# Patient Record
Sex: FEMALE | Race: WHITE | NOT HISPANIC OR LATINO | Employment: OTHER | URBAN - METROPOLITAN AREA
[De-identification: names, ages, dates, MRNs, and addresses within clinical notes are randomized per-mention and may not be internally consistent; named-entity substitution may affect disease eponyms.]

---

## 2017-02-02 RX ORDER — BENAZEPRIL HYDROCHLORIDE 40 MG/1
40 TABLET ORAL DAILY
Qty: 30 TAB | Refills: 11 | Status: SHIPPED | OUTPATIENT
Start: 2017-02-02 | End: 2018-04-05 | Stop reason: SDUPTHER

## 2017-02-02 NOTE — TELEPHONE ENCOUNTER
Was the patient seen in the last year in this department? Yes     Does patient have an active prescription for medications requested? Yes     Received Request Via: Pharmacy     Patient switched pharmacy and needs 90 supply if possible. Thank you!

## 2017-02-16 ENCOUNTER — HOSPITAL ENCOUNTER (OUTPATIENT)
Dept: LAB | Facility: MEDICAL CENTER | Age: 72
End: 2017-02-16
Attending: INTERNAL MEDICINE
Payer: MEDICARE

## 2017-02-16 DIAGNOSIS — E78.5 DYSLIPIDEMIA: ICD-10-CM

## 2017-02-16 DIAGNOSIS — E66.9 OBESITY, CLASS II, BMI 35-39.9, ISOLATED: ICD-10-CM

## 2017-02-16 DIAGNOSIS — E03.8 OTHER SPECIFIED HYPOTHYROIDISM: ICD-10-CM

## 2017-02-16 LAB
ALBUMIN SERPL BCP-MCNC: 4.3 G/DL (ref 3.2–4.9)
ALBUMIN/GLOB SERPL: 1.6 G/DL
ALP SERPL-CCNC: 84 U/L (ref 30–99)
ALT SERPL-CCNC: 54 U/L (ref 2–50)
ANION GAP SERPL CALC-SCNC: 6 MMOL/L (ref 0–11.9)
AST SERPL-CCNC: 37 U/L (ref 12–45)
BASOPHILS # BLD AUTO: 0.6 % (ref 0–1.8)
BASOPHILS # BLD: 0.03 K/UL (ref 0–0.12)
BILIRUB SERPL-MCNC: 0.5 MG/DL (ref 0.1–1.5)
BUN SERPL-MCNC: 16 MG/DL (ref 8–22)
CALCIUM SERPL-MCNC: 10.3 MG/DL (ref 8.4–10.2)
CHLORIDE SERPL-SCNC: 109 MMOL/L (ref 96–112)
CHOLEST SERPL-MCNC: 158 MG/DL (ref 100–199)
CO2 SERPL-SCNC: 22 MMOL/L (ref 20–33)
CREAT SERPL-MCNC: 0.55 MG/DL (ref 0.5–1.4)
EOSINOPHIL # BLD AUTO: 0.31 K/UL (ref 0–0.51)
EOSINOPHIL NFR BLD: 6.5 % (ref 0–6.9)
ERYTHROCYTE [DISTWIDTH] IN BLOOD BY AUTOMATED COUNT: 39.9 FL (ref 35.9–50)
GFR SERPL CREATININE-BSD FRML MDRD: >60 ML/MIN/1.73 M 2
GLOBULIN SER CALC-MCNC: 2.7 G/DL (ref 1.9–3.5)
GLUCOSE SERPL-MCNC: 125 MG/DL (ref 65–99)
HCT VFR BLD AUTO: 39.1 % (ref 37–47)
HDLC SERPL-MCNC: 42 MG/DL
HGB BLD-MCNC: 13.5 G/DL (ref 12–16)
IMM GRANULOCYTES # BLD AUTO: 0.01 K/UL (ref 0–0.11)
IMM GRANULOCYTES NFR BLD AUTO: 0.2 % (ref 0–0.9)
LDLC SERPL CALC-MCNC: 92 MG/DL
LYMPHOCYTES # BLD AUTO: 1 K/UL (ref 1–4.8)
LYMPHOCYTES NFR BLD: 21.1 % (ref 22–41)
MCH RBC QN AUTO: 29.5 PG (ref 27–33)
MCHC RBC AUTO-ENTMCNC: 34.5 G/DL (ref 33.6–35)
MCV RBC AUTO: 85.6 FL (ref 81.4–97.8)
MONOCYTES # BLD AUTO: 0.53 K/UL (ref 0–0.85)
MONOCYTES NFR BLD AUTO: 11.2 % (ref 0–13.4)
NEUTROPHILS # BLD AUTO: 2.87 K/UL (ref 2–7.15)
NEUTROPHILS NFR BLD: 60.4 % (ref 44–72)
NRBC # BLD AUTO: 0 K/UL
NRBC BLD AUTO-RTO: 0 /100 WBC
PLATELET # BLD AUTO: 181 K/UL (ref 164–446)
PMV BLD AUTO: 9.7 FL (ref 9–12.9)
POTASSIUM SERPL-SCNC: 4 MMOL/L (ref 3.6–5.5)
PROT SERPL-MCNC: 7 G/DL (ref 6–8.2)
RBC # BLD AUTO: 4.57 M/UL (ref 4.2–5.4)
SODIUM SERPL-SCNC: 137 MMOL/L (ref 135–145)
T4 FREE SERPL-MCNC: 0.98 NG/DL (ref 0.58–1.64)
TRIGL SERPL-MCNC: 120 MG/DL (ref 0–149)
TSH SERPL DL<=0.005 MIU/L-ACNC: 4.13 UIU/ML (ref 0.35–5.5)
WBC # BLD AUTO: 4.8 K/UL (ref 4.8–10.8)

## 2017-02-16 PROCEDURE — 80061 LIPID PANEL: CPT

## 2017-02-16 PROCEDURE — 84443 ASSAY THYROID STIM HORMONE: CPT

## 2017-02-16 PROCEDURE — 36415 COLL VENOUS BLD VENIPUNCTURE: CPT

## 2017-02-16 PROCEDURE — 84439 ASSAY OF FREE THYROXINE: CPT

## 2017-02-16 PROCEDURE — 85025 COMPLETE CBC W/AUTO DIFF WBC: CPT

## 2017-02-16 PROCEDURE — 80053 COMPREHEN METABOLIC PANEL: CPT

## 2017-02-21 RX ORDER — LEVOTHYROXINE SODIUM 88 UG/1
88 TABLET ORAL
Qty: 30 TAB | Refills: 11 | Status: SHIPPED | OUTPATIENT
Start: 2017-02-21 | End: 2017-12-13 | Stop reason: SDUPTHER

## 2017-02-24 ENCOUNTER — OFFICE VISIT (OUTPATIENT)
Dept: MEDICAL GROUP | Facility: CLINIC | Age: 72
End: 2017-02-24
Payer: MEDICARE

## 2017-02-24 VITALS
TEMPERATURE: 98.2 F | DIASTOLIC BLOOD PRESSURE: 68 MMHG | SYSTOLIC BLOOD PRESSURE: 130 MMHG | HEIGHT: 63 IN | WEIGHT: 195 LBS | BODY MASS INDEX: 34.55 KG/M2 | HEART RATE: 79 BPM | OXYGEN SATURATION: 97 %

## 2017-02-24 DIAGNOSIS — E66.9 OBESITY (BMI 30-39.9): ICD-10-CM

## 2017-02-24 DIAGNOSIS — Z12.11 SCREENING FOR COLON CANCER: ICD-10-CM

## 2017-02-24 DIAGNOSIS — R73.02 IGT (IMPAIRED GLUCOSE TOLERANCE): ICD-10-CM

## 2017-02-24 PROCEDURE — 4040F PNEUMOC VAC/ADMIN/RCVD: CPT | Mod: 8P | Performed by: INTERNAL MEDICINE

## 2017-02-24 PROCEDURE — 3014F SCREEN MAMMO DOC REV: CPT | Performed by: INTERNAL MEDICINE

## 2017-02-24 PROCEDURE — 99214 OFFICE O/P EST MOD 30 MIN: CPT | Performed by: INTERNAL MEDICINE

## 2017-02-24 PROCEDURE — G8419 CALC BMI OUT NRM PARAM NOF/U: HCPCS | Performed by: INTERNAL MEDICINE

## 2017-02-24 PROCEDURE — G8484 FLU IMMUNIZE NO ADMIN: HCPCS | Performed by: INTERNAL MEDICINE

## 2017-02-24 PROCEDURE — 1101F PT FALLS ASSESS-DOCD LE1/YR: CPT | Performed by: INTERNAL MEDICINE

## 2017-02-24 PROCEDURE — 3017F COLORECTAL CA SCREEN DOC REV: CPT | Mod: 1P | Performed by: INTERNAL MEDICINE

## 2017-02-24 PROCEDURE — 1036F TOBACCO NON-USER: CPT | Performed by: INTERNAL MEDICINE

## 2017-02-24 PROCEDURE — G8432 DEP SCR NOT DOC, RNG: HCPCS | Performed by: INTERNAL MEDICINE

## 2017-02-24 NOTE — PROGRESS NOTES
CC: Shikha Swift is a 71 y.o. female is suffering from   Chief Complaint   Patient presents with   • Follow-Up     [facility] on labs          SUBJECTIVE:  1. Obesity (BMI 30-39.9)  Patient's here for follow-up we've discussed repeatedly her need work on diet exercise now that it's past holiday seasons.     2. Screening for colon cancer  Patient needs screening for colon cancer, referral to gastroenterology written.     3. IGT (impaired glucose tolerance)  Impaired glucose tolerance, very close to being diabetic recheck fasting comprehensive metabolic panel in 6 months        Past social, family, history: , [place]  Social History   Substance Use Topics   • Smoking status: Never Smoker    • Smokeless tobacco: Never Used   • Alcohol Use: 0.0 oz/week      Comment: 1-2 per month         MEDICATIONS:    Current outpatient prescriptions:   •  levothyroxine (SYNTHROID) 88 MCG Tab, Take 1 Tab by mouth Every morning on an empty stomach., Disp: 30 Tab, Rfl: 11  •  benazepril (LOTENSIN) 40 MG tablet, Take 1 Tab by mouth every day., Disp: 30 Tab, Rfl: 11  •  atorvastatin (LIPITOR) 10 MG Tab, TAKE 1 TABLET BY MOUTH EVERY DAY., Disp: 90 Tab, Rfl: 3  •  amlodipine (NORVASC) 5 MG Tab, TAKE 1 TAB BY MOUTH EVERY DAY., Disp: 90 Tab, Rfl: 3  •  estradiol (ESTRACE VAGINAL) 0.1 MG/GM vaginal cream, Insert 1 g in vagina Every Sunday and Wednesday., Disp: , Rfl:   •  PROGESTERONE, Apply 1 Application to affected area(s) every evening. Compounded cream [facility], Disp: , Rfl:   •  gabapentin (NEURONTIN) 300 MG Cap, Take 3 Caps by mouth every evening., Disp: 90 Cap, Rfl: 2  •  polyethylene glycol 3350 (MIRALAX) Powder, Take 17 g by mouth every day., Disp: 1 Bottle, Rfl: 3  •  amoxicillin (AMOXIL) 500 MG Cap, Take 4 Caps by mouth 1 time daily as needed (1 hour before dental proceedures.)., Disp: 12 Cap, Rfl: 1  •  HYDROmorphone (DILAUDID) 2 MG Tab, Take 0.5-2 Tabs by mouth every four hours as needed (pain)., Disp: 90 Tab,  "Rfl: 0  •  Probiotic Product (ACIDOPHILUS PROBIOTIC BLEND PO), Take 15 mL by mouth every day., Disp: , Rfl:   •  Ibuprofen (ADVIL) 200 MG CAPS, Take 1-2 Caps by mouth 1 time daily as needed., Disp: , Rfl:     PROBLEMS:  Patient Active Problem List    Diagnosis Date Noted   • Obesity (BMI 30-39.9) 02/24/2017   • Primary localized osteoarthritis of left hip 02/16/2016   • Atypical face pain    • Essential hypertension 08/11/2015   • Obesity, Class II, BMI 35-39.9, isolated (CMS-[facility]) 06/15/2015   • Headache 03/01/2012   • Dyslipidemia 05/22/2009   • Impaired glucose tolerance 05/22/2009   • Hypothyroid 05/22/2009       REVIEW OF SYSTEMS:  Gen.:  No Nausea, Vomiting, fever, Chills.  Heart: No chest pain.  Lungs:  No shortness of Breath.  Psychological: Mack unusual Anxiety depression     PHYSICAL EXAM   Constitutional: Alert, cooperative, not in acute distress.  Cardiovascular:  Rate Rhythm is regular without murmurs rubs clicks.     Thorax & Lungs: Clear to auscultation, no wheezing, rhonchi, or rales  HENT: Normocephalic, Atraumatic.  Eyes: PERRLA, EOMI, Conjunctiva normal.   Neck: Trachia is midline no swelling of the thyroid.   Lymphatic: No lymphadenopathy noted.   Neurologic: Alert & oriented x 3, cranial nerves II through XII are intact, Normal motor function, Normal sensory function, No focal deficits noted.   Psychiatric: Affect normal, Judgment normal, Mood normal.     VITAL SIGNS:/68 mmHg  Pulse 79  Temp(Src) 36.8 °C (98.2 °F)  Ht 1.6 m (5' 3\")  Wt 88.451 kg (195 lb)  BMI 34.55 kg/m2  SpO2 97%    Labs: Reviewed    Assessment:                                                     Plan:    1. Obesity (BMI 30-39.9)  Patient with excessive weight and encouraged patient to work on diet exercise    2. Screening for colon cancer  Referral to gastroenterology for colonoscopy orders written  - REFERRAL TO GI FOR COLONOSCOPY    3. IGT (impaired glucose tolerance)  Patient was at significant risk for type 2 " diabetes recheck conference metabolic panel in 6 months  - COMP METABOLIC PANEL; Future

## 2017-02-24 NOTE — MR AVS SNAPSHOT
"        Shikha Swift   2017 9:20 AM   Office Visit   MRN: [id number]    Department:  [facility]   Dept Phone:  [phone]    Description:  Female : 1945   Provider:  Presley Black D.O.           Reason for Visit     Follow-Up [facility] on labs       Allergies as of 2017     Allergen Noted Reactions    Hydrocodone 2015   Vomiting, Nausea    GXO=0862      Percocet [Oxycodone-Acetaminophen] 2016   Vomiting, Nausea    OHK=4122    Vicodin [Hydrocodone-Acetaminophen] 2016   Vomiting, Nausea    VJT=0350      You were diagnosed with     Obesity (BMI 30-39.9)   [701376]       Screening for colon cancer   [828973]       IGT (impaired glucose tolerance)   [378497]         Vital Signs     Blood Pressure Pulse Temperature Height Weight Body Mass Index    130/68 mmHg 79 36.8 °C (98.2 °F) 1.6 m (5' 3\") 88.451 kg (195 lb) 34.55 kg/m2    Oxygen Saturation Smoking Status                97% Never Smoker           Basic Information     Date Of Birth Sex Race Ethnicity Preferred Language    1945 Female White Non- English      Your appointments     Aug 28, 2017 11:00 AM   Established Patient with Presley Black D.O.   [address] [id number] [phone]           You will be receiving a confirmation call a few days before your appointment from our automated call confirmation system.              Problem List              ICD-10-CM Priority Class Noted - Resolved    Dyslipidemia E78.5   2009 - Present    Impaired glucose tolerance R73.02   2009 - Present    Hypothyroid E03.9   2009 - Present    Headache R51   3/1/2012 - Present    Obesity, Class II, BMI 35-39.9, isolated (CMS-HCC) E66.01   6/15/2015 - Present    Essential hypertension I10   2015 - Present    Atypical face pain G50.1   Unknown - Present    Primary localized osteoarthritis of left hip M16.12   2016 - Present   " Obesity (BMI 30-39.9) E66.9   2/24/2017 - Present      Health Maintenance        Date Due Completion Dates    IMM DTaP/Tdap/Td Vaccine (1 - Tdap) 4/29/1966 ---    COLONOSCOPY 4/29/1997 ---    IMM PNEUMOCOCCAL 65+ (ADULT) LOW/MEDIUM RISK SERIES (1 of 2 - PCV13) 4/29/2012 ---    IMM INFLUENZA (1) 9/1/2016 ---    MAMMOGRAM 11/7/2017 11/7/2016, 11/4/2015, 11/3/2014, 10/30/2013, 10/29/2012, 10/27/2011, 10/25/2010, 10/23/2009, 10/23/2009, 10/6/2008, 10/6/2008, 10/3/2007, 10/3/2007, 10/2/2006, 10/2/2006, 8/4/2005, 2/12/2004, 1/29/2004    BONE DENSITY 11/20/2018 11/20/2013 (Done)    Override on 11/20/2013: Done (Dr. Stevenson )            Current Immunizations     SHINGLES VACCINE 9/15/2015      Below and/or attached are the medications your provider expects you to take. Review all of your home medications and newly ordered medications with your provider and/or pharmacist. Follow medication instructions as directed by your provider and/or pharmacist. Please keep your medication list with you and share with your provider. Update the information when medications are discontinued, doses are changed, or new medications (including over-the-counter products) are added; and carry medication information at all times in the event of emergency situations     Allergies:  HYDROCODONE - Vomiting,Nausea     PERCOCET - Vomiting,Nausea     VICODIN - Vomiting,Nausea               Medications  Valid as of: February 24, 2017 -  9:58 AM    Generic Name Brand Name Tablet Size Instructions for use    AmLODIPine Besylate (Tab) NORVASC 5 MG TAKE 1 TAB BY MOUTH EVERY DAY.        Amoxicillin (Cap) AMOXIL 500 MG Take 4 Caps by mouth 1 time daily as needed (1 hour before dental proceedures.).        Atorvastatin Calcium (Tab) LIPITOR 10 MG TAKE 1 TABLET BY MOUTH EVERY DAY.        Benazepril HCl (Tab) LOTENSIN 40 MG Take 1 Tab by mouth every day.        Estradiol (Cream) ESTRACE 0.1 MG/GM Insert 1 g in vagina Every Sunday and Wednesday.         Gabapentin (Cap) NEURONTIN 300 MG Take 3 Caps by mouth every evening.        HYDROmorphone HCl (Tab) DILAUDID 2 MG Take 0.5-2 Tabs by mouth every four hours as needed (pain).        Ibuprofen (Cap) Ibuprofen 200 MG Take 1-2 Caps by mouth 1 time daily as needed.        Levothyroxine Sodium (Tab) SYNTHROID 88 MCG Take 1 Tab by mouth Every morning on an empty stomach.        Polyethylene Glycol 3350 (Powder) MIRALAX  Take 17 g by mouth every day.        Probiotic Product   Take 15 mL by mouth every day.        Progesterone   Apply 1 Application to affected area(s) every evening. Compounded cream [facility]        .                 Medicines prescribed today were sent to:     [facility] [address] [place] [id number] [facility]    [address] [facility] [address]    Phone: [phone] Fax: [phone]    Open 24 Hours?: No      Medication refill instructions:       If your prescription bottle indicates you have medication refills left, it is not necessary to call your provider’s office. Please contact your pharmacy and they will refill your medication.    If your prescription bottle indicates you do not have any refills left, you may request refills at any time through one of the following ways: The online uberVU system (except Urgent Care), by calling your provider’s office, or by asking your pharmacy to contact your provider’s office with a refill request. Medication refills are processed only during regular business hours and may not be available until the next business day. Your provider may request additional information or to have a follow-up visit with you prior to refilling your medication.   *Please Note: Medication refills are assigned a new Rx number when refilled electronically. Your pharmacy may indicate that no refills were authorized even though a new prescription for the same medication is available at the pharmacy. Please request the medicine  by name with the pharmacy before contacting your provider for a refill.        Your To Do List     Future Labs/Procedures Complete By Expires    COMP METABOLIC PANEL  As directed 2/25/2018      Referral     A referral request has been sent to our patient care coordination department. Please allow 3-5 business days for us to process this request and contact you either by phone or mail. If you do not hear from us by the 5th business day, please call us at [phone].           MyChart Status: Patient Declined

## 2017-03-27 ENCOUNTER — PATIENT OUTREACH (OUTPATIENT)
Dept: HEALTH INFORMATION MANAGEMENT | Facility: OTHER | Age: 72
End: 2017-03-27

## 2017-03-28 ENCOUNTER — PATIENT OUTREACH (OUTPATIENT)
Dept: HEALTH INFORMATION MANAGEMENT | Facility: OTHER | Age: 72
End: 2017-03-28

## 2017-03-28 NOTE — PROGRESS NOTES
Attempt #:1    Verify PCP: yes    Communication Preference Obtained: yes     Review Care Team: yes    Annual Wellness Visit Scheduling  1. Scheduling Status:Scheduled     Care Gap Scheduling (Attempt to Schedule EACH Overdue Care Gap!)     Health Maintenance Due   Topic Date Due   • Annual Wellness Visit  SCHEDULED   • COLONOSCOPY  WILL DISCUSS WITH PCP         Juan Activation: declined  Imeldahart Joe: no  Virtual Visits: no  Opt In to Text Messages: no

## 2017-04-21 ENCOUNTER — OFFICE VISIT (OUTPATIENT)
Dept: MEDICAL GROUP | Facility: CLINIC | Age: 72
End: 2017-04-21
Payer: MEDICARE

## 2017-04-21 VITALS
WEIGHT: 195 LBS | SYSTOLIC BLOOD PRESSURE: 144 MMHG | BODY MASS INDEX: 34.55 KG/M2 | HEIGHT: 63 IN | HEART RATE: 66 BPM | OXYGEN SATURATION: 95 % | TEMPERATURE: 99.1 F | DIASTOLIC BLOOD PRESSURE: 86 MMHG

## 2017-04-21 DIAGNOSIS — E78.5 DYSLIPIDEMIA: ICD-10-CM

## 2017-04-21 DIAGNOSIS — Z96.642 STATUS POST TOTAL HIP REPLACEMENT, LEFT: ICD-10-CM

## 2017-04-21 DIAGNOSIS — E66.9 OBESITY, CLASS II, BMI 35-39.9, ISOLATED: ICD-10-CM

## 2017-04-21 DIAGNOSIS — Z00.00 MEDICARE ANNUAL WELLNESS VISIT, INITIAL: Primary | ICD-10-CM

## 2017-04-21 DIAGNOSIS — G50.1 ATYPICAL FACE PAIN: ICD-10-CM

## 2017-04-21 DIAGNOSIS — R73.02 IMPAIRED GLUCOSE TOLERANCE: ICD-10-CM

## 2017-04-21 DIAGNOSIS — I10 ESSENTIAL HYPERTENSION: ICD-10-CM

## 2017-04-21 DIAGNOSIS — E66.9 OBESITY (BMI 30-39.9): ICD-10-CM

## 2017-04-21 PROCEDURE — 1036F TOBACCO NON-USER: CPT | Performed by: NURSE PRACTITIONER

## 2017-04-21 PROCEDURE — G8510 SCR DEP NEG, NO PLAN REQD: HCPCS | Performed by: NURSE PRACTITIONER

## 2017-04-21 PROCEDURE — G0438 PPPS, INITIAL VISIT: HCPCS | Performed by: NURSE PRACTITIONER

## 2017-04-21 ASSESSMENT — PATIENT HEALTH QUESTIONNAIRE - PHQ9: CLINICAL INTERPRETATION OF PHQ2 SCORE: 1

## 2017-04-21 NOTE — ASSESSMENT & PLAN NOTE
Total L hip replacement 2/16/2016  States good mobility and pain free  Followed by ortho, next visit 5 yrs

## 2017-04-21 NOTE — PATIENT INSTRUCTIONS
Continue with care through Presley Black D.O..  Next Medicare Annual Wellness Visit is due in one year.    Continue care with specialists you are seeing for your chronic problems.    Attached is some preventative information for you to read.          Fall Prevention and Home Safety  Falls cause injuries and can affect all age groups. It is possible to prevent falls.   HOW TO PREVENT FALLS  · Wear shoes with rubber soles that do not have an opening for your toes.   · Keep the inside and outside of your house well lit.   · Use night lights throughout your home.   · Remove clutter from floors.   · Clean up floor spills.   · Remove throw rugs or fasten them to the floor with carpet tape.   · Do not place electrical cords across pathways.   · Put grab bars by your tub, shower, and toilet. Do not use towel bars as grab bars.   · Put handrails on both sides of the stairway. Fix loose handrails.   · Do not climb on stools or stepladders, if possible.   · Do not wax your floors.   · Repair uneven or unsafe sidewalks, walkways, or stairs.   · Keep items you use a lot within reach.   · Be aware of pets.   · Keep emergency numbers next to the telephone.   · Put smoke detectors in your home and near bedrooms.   Ask your doctor what other things you can do to prevent falls.  Document Released: 10/14/2010 Document Revised: 06/18/2013 Document Reviewed: 03/19/2013  ExitCare® Patient Information ©2013 [employer].    Exercise to Stay Healthy      Exercise helps you become and stay healthy.  EXERCISE IDEAS AND TIPS  Choose exercises that:  · You enjoy.   · Fit into your day.   You do not need to exercise really hard to be healthy. You can do exercises at a slow or medium level and stay healthy. You can:  · Stretch before and after working out.   · Try yoga, Pilates, or lew chi.   · Lift weights.   · Walk fast, swim, jog, run, climb stairs, bicycle, dance, or rollerskate.   · Take aerobic classes.   Exercises that burn about  150 calories:  · Running 1 ½ miles in 15 minutes.   · Playing volleyball for 45 to 60 minutes.   · Washing and waxing a car for 45 to 60 minutes.   · Playing touch football for 45 minutes.   · Walking 1 ¾ miles in 35 minutes.   · Pushing a stroller 1 ½ miles in 30 minutes.   · Playing basketball for 30 minutes.   · Raking leaves for 30 minutes.   · Bicycling 5 miles in 30 minutes.   · Walking 2 miles in 30 minutes.   · Dancing for 30 minutes.   · Shoveling snow for 15 minutes.   · Swimming laps for 20 minutes.   · Walking up stairs for 15 minutes.   · Bicycling 4 miles in 15 minutes.   · Gardening for 30 to 45 minutes.   · Jumping rope for 15 minutes.   · Washing windows or floors for 45 to 60 minutes.     One suggestion is to start walking for 10 minutes after each meal.  This will help with digestion and help you to metabolize your meal.       For Weight Loss -   Recommend portion sizes with more fruits/vegetables/high fiber foods.  Stay away from white bread/pastas/white rice/white potatoes.  Increase Fluid intake to 6-8 glasses of water daily.   Eliminate soda's (diet and regular) from your fluid intake.      Document Released: 01/20/2012 Document Revised: 03/11/2013 Document Reviewed: 01/20/2012  ExitCare® Patient Information ©2013 [employer].    Fat and Cholesterol Control Diet  Cholesterol is a wax-like substance. It comes from your liver and is found in certain foods. There is good (HDL) and bad (LDL) cholesterol. Too much cholesterol in your blood can affect your heart. Certain foods can lower or raise your cholesterol. Eat foods that are low in cholesterol.  Saturated and trans fats are bad fats found in foods that will raise your cholesterol. Do not eat foods that are high in saturated and trans fats.  FOODS HIGHER IN SATURATED AND TRANS FATS  · Dairy products, such as whole milk, eggs, cheese, cream, and butter.   · Fatty meats, such as hot dogs, sausage, and salami.   · Fried foods.   · Trans fats  which are found in margarine and pre-made cookies, crackers, and baked goods.   · Tropical oils, such as coconut and palm oils.   Read package labels at the store. Do not buy products that use saturated or trans fats or hydrogenated oils. Find foods labeled:  · Low-fat.   · Low-saturated fat.   · Trans-fat-free.   · Low-cholesterol.   FOODS LOWER IN CHOLESTEROL  ·  Fruit.   · Vegetables.   · Beans, peas, and lentils.   · Fish.   · Lean meat, such as chicken (without skin) or ground turkey.   · Grains, such as barley, rice, couscous, bulgur wheat, and pasta.   · Heart-healthy tub margarine.   PREPARING YOUR FOOD  · Broil, bake, steam, or roast foods. Do not marks food.   · Use non-stick cooking sprays.   · Use lemon or herbs to flavor food instead of using butter or stick margarine.   · Use nonfat yogurt, salsa, or low-fat dressings for salads.   Document Released: 06/18/2013 Document Reviewed: 06/18/2013  ExitCare® Patient Information ©2013 [employer].    Recommend annual flu vaccine.  Next due in Fall, 2015.  If you decide not to have the flu vaccine, recommend good handwashing and staying out of crowds during flu season.

## 2017-04-21 NOTE — PROGRESS NOTES
CC:   Medicare Annual Wellness Visit    HPI:  Shikha is a 71 y.o. here for Medicare Annual Wellness Visit    Patient Active Problem List    Diagnosis Date Noted   • Status post total hip replacement, left 02/16/2016   • Atypical face pain    • Essential hypertension 08/11/2015   • Obesity, Class II, BMI 35-39.9, isolated (CMS-[facility]) 06/15/2015   • Headache 03/01/2012   • Dyslipidemia 05/22/2009   • Impaired glucose tolerance 05/22/2009   • Hypothyroid 05/22/2009     Current Outpatient Prescriptions   Medication Sig Dispense Refill   • levothyroxine (SYNTHROID) 88 MCG Tab Take 1 Tab by mouth Every morning on an empty stomach. 30 Tab 11   • benazepril (LOTENSIN) 40 MG tablet Take 1 Tab by mouth every day. 30 Tab 11   • atorvastatin (LIPITOR) 10 MG Tab TAKE 1 TABLET BY MOUTH EVERY DAY. 90 Tab 3   • amlodipine (NORVASC) 5 MG Tab TAKE 1 TAB BY MOUTH EVERY DAY. 90 Tab 3   • Probiotic Product (ACIDOPHILUS PROBIOTIC BLEND PO) Take 15 mL by mouth every day.     • estradiol (ESTRACE VAGINAL) 0.1 MG/GM vaginal cream Insert 1 g in vagina Every Sunday and Wednesday.     • Ibuprofen (ADVIL) 200 MG CAPS Take 1-2 Caps by mouth 1 time daily as needed.     • PROGESTERONE Apply 1 Application to affected area(s) every evening. Compounded cream [facility]       No current facility-administered medications for this visit.      Current supplements: no  Chronic narcotic pain medicines: yes  Allergies: Hydrocodone; Percocet; and Vicodin  Exercise: as papa  Current social contact/activities: Has support of family and friends      Screening:  Depression Screening    Little interest or pleasure in doing things?  1 - several days  Feeling down, depressed , or hopeless? 0 - not at all  Trouble falling or staying asleep, or sleeping too much?     Feeling tired or having little energy?     Poor appetite or overeating?     Feeling bad about yourself - or that you are a failure or have let yourself or your family down?    Trouble concentrating  on things, such as reading the newspaper or watching television?    Moving or speaking so slowly that other people could have noticed.  Or the opposite - being so fidgety or restless that you have been moving around a lot more than usual?     Thoughts that you would be better off dead, or of hurting yourself?     Patient Health Questionnaire Score:    If depressive symptoms identified deferred to follow up visit unless specifically addressed in assessment and plan.      Screening for Cognitive Impairment    Three Minute Recall (banana, sunrise, fence)  3/3    Draw clock face with all 12 numbers set to the hand to show 10 minures past 11 o'clock  0 3  If cognitive concerns identified deferred to follow up visit unless specifically addressed in assessment and plan.    Fall Risk Assessment    Has the patient had two or more falls in the last year or any fall with injury in the last year?  No  If Fall Risk identified deferred to follow up visit unless specifically addressed in assessment and plan.    Safety Assessment    Throw rugs on floor.  Yes  Handrails on all stairs.  Yes  Good lighting in all hallways.  Yes  Difficulty hearing.  No  Patient counseled about all safety risks that were identified.    Functional Assessment ADLs    Are there any barriers preventing you from cooking for yourself or meeting nutritional needs?  No.    Are there any barriers preventing you from driving safely or obtaining transportation?  No.    Are there any barriers preventing you from using a telephone or calling for help?  No.    Are there any barriers preventing you from shopping?  No.    Are there any barriers preventing you from taking care of your own finances?  No.    Are there any barriers preventing you from managing your medications?  No.    Are currently engaing any exercise or physical activity?  Yes.       Health Maintenance Summary                Annual Wellness Visit Overdue 1945     COLONOSCOPY Overdue 4/29/1997      "IMM PNEUMOCOCCAL 65+ (ADULT) LOW/MEDIUM RISK SERIES Postponed 3/28/2018 Originally 4/29/2012. Patient Refused    IMM DTaP/Tdap/Td Vaccine Postponed 3/28/2018 Originally 4/29/1966. Patient Refused    MAMMOGRAM Next Due 11/7/2017      Done 11/7/2016 MA-MAMMO SCREENING BILAT W/TOMOSYNTHESIS W/CAD     Patient has more history with this topic...    BONE DENSITY Next Due 11/20/2018      Done 11/20/2013 Dr. Perea.           Patient Care Team:  Presley Black D.O. as PCP - General  Eduar Gupta M.D. as Consulting Physician (Ophthalmology)  Gabriela Perea M.D. as Consulting Physician (OB/Gyn)        Social History   Substance Use Topics   • Smoking status: Never Smoker    • Smokeless tobacco: Never Used   • Alcohol Use: 0.0 oz/week      Comment: 1-2 per month       Family History   Problem Relation Age of Onset   • Diabetes     • Hypertension     • Stroke     • Cancer     • Diabetes Mother      She  has a past medical history of Hypothyroid; Hypertension; Headache(784.0) (3/1/2012); Atypical face pain; Infectious disease; and Cold ().   Past Surgical History   Procedure Laterality Date   • Tonsillectomy  as child   • Thyroidectomy  as child     goiter   • Pr part excis plantar fascia  2004     left   • Trigger finger release  3/2009     left index and middle   • Trigger finger release  11/17/2009     Performed by JOSE ANGEL SRIVASTAVA at SURGERY [facility]   • Us-needle core bx-breast panel       right   • Hip arthroplasty total Left 2/16/2016     Procedure: HIP ARTHROPLASTY TOTAL;  Surgeon: Addy Chen M.D.;  Location: [facility];  Service:        ROS:    Ostomy or other tubes or amputations: no  Chronic oxygen use no  Last eye exam 2016  : denies incontinence.   Gait: Uses no assistive device   Hearing good.    Dentition good    Exam: Blood pressure 144/86, pulse 66, temperature 37.3 °C (99.1 °F), height 1.6 m (5' 2.99\"), weight 88.451 kg (195 lb), SpO2 95 %. Body mass index is 34.55 " kg/(m^2).  Alert, oriented in no acute distress.  Eye contact is good, speech goal directed, affect calm      Assessment and Plan. The following treatment and monitoring plan is recommended for all problems as listed below:   Dyslipidemia  Chronic condition managed with current medical regimen  2/2017   Cholesterol,Tot 158 100 - 199 mg/dL Final      Triglycerides 120 0 - 149 mg/dL Final     HDL 42 >=40 mg/dL Final     LDL 92 <100 mg/dL Final   Stable per review   Continue with current meds  Followed by Presley Black D.O..        Impaired glucose tolerance  2/16/2017   Glucose 125 (H) 65 - 99 mg/dL Final   Diet managed, is attending a class in 1 wk for dm  Followed by Presley Black D.O..       Hypothyroid  Chronic condition managed with current medical regimen  Stable per review   Continue with current meds  Followed by Presley Black D.O..        Obesity, Class II, BMI 35-39.9, isolated  Patient aware of relationship between weight and health and working on diet  Followed by Presley Black D.O..     Essential hypertension  Chronic condition managed with current medical regimen  Stable per review   Continue with current meds  Followed by Presley Black D.O..        Status post total hip replacement, left  Total L hip replacement 2/16/2016  States good mobility and pain free  Followed by ortho, next visit 5 yrs       Obesity (BMI 30-39.9)  duplicate    Headache  Per pt h/a related to tooth pain l side lower jaw  Planning on seeing dentist    Atypical face pain  [place] palsy 25 yrs ago with rare and intermit pain  Followed by Presley Black D.O..         Health Care Screening recommendations reviewed with patient today: per Patient Instructions  DPA/Advanced directive: .has NO advanced directive - not interested in additional information    Next office visit for recheck of chronic medical conditions is due with Presley Black D.O. in 8/28/2017

## 2017-04-21 NOTE — ASSESSMENT & PLAN NOTE
2/16/2017   Glucose 125 (H) 65 - 99 mg/dL Final   Diet managed, is attending a class in 1 wk for dm  Followed by Presley Black D.O..

## 2017-04-21 NOTE — ASSESSMENT & PLAN NOTE
Patient aware of relationship between weight and health and working on diet  Followed by Presley Black D.O..

## 2017-04-21 NOTE — MR AVS SNAPSHOT
"        Shikha Swift   2017 3:00 PM   Office Visit   MRN: [id number]    Department:  [facility]   Dept Phone:  [phone]    Description:  Female : 1945   Provider:  JULIET Rowell           Reason for Visit     Annual Exam initial      Allergies as of 2017     Allergen Noted Reactions    Hydrocodone 2015   Vomiting, Nausea    [id number]      Percocet [Oxycodone-Acetaminophen] 2016   Vomiting, Nausea    [id number]    Vicodin [Hydrocodone-Acetaminophen] 2016   Vomiting, Nausea    KYA=5032      You were diagnosed with     Dyslipidemia   [285323]       Impaired glucose tolerance   [819333]       Obesity, Class II, BMI 35-39.9, isolated (CMS-HCC)   [100982]       Essential hypertension   [9247522]       Primary localized osteoarthritis of left hip   [7858910]       Obesity (BMI 30-39.9)   [048530]         Vital Signs     Blood Pressure Pulse Temperature Height Weight Body Mass Index    144/86 mmHg 66 37.3 °C (99.1 °F) 1.6 m (5' 2.99\") 88.451 kg (195 lb) 34.55 kg/m2    Oxygen Saturation Smoking Status                95% Never Smoker           Basic Information     Date Of Birth Sex Race Ethnicity Preferred Language    1945 Female White Non- English      Your appointments     Aug 28, 2017 11:00 AM   Established Patient with Presley Black D.O.   [address]  [facility] [id number] [phone]           You will be receiving a confirmation call a few days before your appointment from our automated call confirmation system.              Problem List              ICD-10-CM Priority Class Noted - Resolved    Dyslipidemia E78.5   2009 - Present    Impaired glucose tolerance R73.02   2009 - Present    Hypothyroid E03.9   2009 - Present    Headache R51   3/1/2012 - Present    Obesity, Class II, BMI 35-39.9, isolated (CMS-HCC) E66.01   6/15/2015 - Present    Essential hypertension I10   " 8/11/2015 - Present    Atypical face pain G50.1   Unknown - Present    Primary localized osteoarthritis of left hip M16.12   2/16/2016 - Present      Health Maintenance        Date Due Completion Dates    COLONOSCOPY 4/29/1997 ---    IMM PNEUMOCOCCAL 65+ (ADULT) LOW/MEDIUM RISK SERIES (1 of 2 - PCV13) 3/28/2018 (Originally 4/29/2012) ---    IMM DTaP/Tdap/Td Vaccine (1 - Tdap) 3/28/2018 (Originally 4/29/1966) ---    MAMMOGRAM 11/7/2017 11/7/2016, 11/4/2015, 11/3/2014, 10/30/2013, 10/29/2012, 10/27/2011, 10/25/2010, 10/23/2009, 10/23/2009, 10/6/2008, 10/6/2008, 10/3/2007, 10/3/2007, 10/2/2006, 10/2/2006, 8/4/2005, 2/12/2004, 1/29/2004    BONE DENSITY 11/20/2018 11/20/2013 (Done)    Override on 11/20/2013: Done (Dr. Stevenson )            Current Immunizations     SHINGLES VACCINE 9/15/2015      Below and/or attached are the medications your provider expects you to take. Review all of your home medications and newly ordered medications with your provider and/or pharmacist. Follow medication instructions as directed by your provider and/or pharmacist. Please keep your medication list with you and share with your provider. Update the information when medications are discontinued, doses are changed, or new medications (including over-the-counter products) are added; and carry medication information at all times in the event of emergency situations     Allergies:  HYDROCODONE - Vomiting,Nausea     PERCOCET - Vomiting,Nausea     VICODIN - Vomiting,Nausea               Medications  Valid as of: April 21, 2017 -  3:30 PM    Generic Name Brand Name Tablet Size Instructions for use    AmLODIPine Besylate (Tab) NORVASC 5 MG TAKE 1 TAB BY MOUTH EVERY DAY.        Amoxicillin (Cap) AMOXIL 500 MG Take 4 Caps by mouth 1 time daily as needed (1 hour before dental proceedures.).        Atorvastatin Calcium (Tab) LIPITOR 10 MG TAKE 1 TABLET BY MOUTH EVERY DAY.        Benazepril HCl (Tab) LOTENSIN 40 MG Take 1 Tab by mouth every day.          Estradiol (Cream) ESTRACE 0.1 MG/GM Insert 1 g in vagina Every Sunday and Wednesday.        Gabapentin (Cap) NEURONTIN 300 MG Take 3 Caps by mouth every evening.        HYDROmorphone HCl (Tab) DILAUDID 2 MG Take 0.5-2 Tabs by mouth every four hours as needed (pain).        Ibuprofen (Cap) Ibuprofen 200 MG Take 1-2 Caps by mouth 1 time daily as needed.        Levothyroxine Sodium (Tab) SYNTHROID 88 MCG Take 1 Tab by mouth Every morning on an empty stomach.        Polyethylene Glycol 3350 (Powder) MIRALAX  Take 17 g by mouth every day.        Probiotic Product   Take 15 mL by mouth every day.        Progesterone   Apply 1 Application to affected area(s) every evening. Compounded cream [facility]        .                 Medicines prescribed today were sent to:     [facility] [address] [place] [address] [facility]    [address] [facility] [address]    Phone: [phone] Fax: [phone]    Open 24 Hours?: No      Medication refill instructions:       If your prescription bottle indicates you have medication refills left, it is not necessary to call your provider’s office. Please contact your pharmacy and they will refill your medication.    If your prescription bottle indicates you do not have any refills left, you may request refills at any time through one of the following ways: The online [employer] system (except Urgent Care), by calling your provider’s office, or by asking your pharmacy to contact your provider’s office with a refill request. Medication refills are processed only during regular business hours and may not be available until the next business day. Your provider may request additional information or to have a follow-up visit with you prior to refilling your medication.   *Please Note: Medication refills are assigned a new Rx number when refilled electronically. Your pharmacy may indicate that no refills were authorized even though a new  prescription for the same medication is available at the pharmacy. Please request the medicine by name with the pharmacy before contacting your provider for a refill.        Instructions    Continue with care through Presley Black D.O..  Next Medicare Annual Wellness Visit is due in one year.    Continue care with specialists you are seeing for your chronic problems.    Attached is some preventative information for you to read.          Fall Prevention and Home Safety  Falls cause injuries and can affect all age groups. It is possible to prevent falls.   HOW TO PREVENT FALLS  · Wear shoes with rubber soles that do not have an opening for your toes.   · Keep the inside and outside of your house well lit.   · Use night lights throughout your home.   · Remove clutter from floors.   · Clean up floor spills.   · Remove throw rugs or fasten them to the floor with carpet tape.   · Do not place electrical cords across pathways.   · Put grab bars by your tub, shower, and toilet. Do not use towel bars as grab bars.   · Put handrails on both sides of the stairway. Fix loose handrails.   · Do not climb on stools or stepladders, if possible.   · Do not wax your floors.   · Repair uneven or unsafe sidewalks, walkways, or stairs.   · Keep items you use a lot within reach.   · Be aware of pets.   · Keep emergency numbers next to the telephone.   · Put smoke detectors in your home and near bedrooms.   Ask your doctor what other things you can do to prevent falls.  Document Released: 10/14/2010 Document Revised: 06/18/2013 Document Reviewed: 03/19/2013  ExitCare® Patient Information ©2013 [employer].    Exercise to Stay Healthy      Exercise helps you become and stay healthy.  EXERCISE IDEAS AND TIPS  Choose exercises that:  · You enjoy.   · Fit into your day.   You do not need to exercise really hard to be healthy. You can do exercises at a slow or medium level and stay healthy. You can:  · Stretch before and after working  out.   · Try yoga, Pilates, or lew chi.   · Lift weights.   · Walk fast, swim, jog, run, climb stairs, bicycle, dance, or rollerskate.   · Take aerobic classes.   Exercises that burn about 150 calories:  · Running 1 ½ miles in 15 minutes.   · Playing volleyball for 45 to 60 minutes.   · Washing and waxing a car for 45 to 60 minutes.   · Playing touch football for 45 minutes.   · Walking 1 ¾ miles in 35 minutes.   · Pushing a stroller 1 ½ miles in 30 minutes.   · Playing basketball for 30 minutes.   · Raking leaves for 30 minutes.   · Bicycling 5 miles in 30 minutes.   · Walking 2 miles in 30 minutes.   · Dancing for 30 minutes.   · Shoveling snow for 15 minutes.   · Swimming laps for 20 minutes.   · Walking up stairs for 15 minutes.   · Bicycling 4 miles in 15 minutes.   · Gardening for 30 to 45 minutes.   · Jumping rope for 15 minutes.   · Washing windows or floors for 45 to 60 minutes.     One suggestion is to start walking for 10 minutes after each meal.  This will help with digestion and help you to metabolize your meal.       For Weight Loss -   Recommend portion sizes with more fruits/vegetables/high fiber foods.  Stay away from white bread/pastas/white rice/white potatoes.  Increase Fluid intake to 6-8 glasses of water daily.   Eliminate soda's (diet and regular) from your fluid intake.      Document Released: 01/20/2012 Document Revised: 03/11/2013 Document Reviewed: 01/20/2012  ExitCare® Patient Information ©2013 [employer], [facility].    Fat and Cholesterol Control Diet  Cholesterol is a wax-like substance. It comes from your liver and is found in certain foods. There is good (HDL) and bad (LDL) cholesterol. Too much cholesterol in your blood can affect your heart. Certain foods can lower or raise your cholesterol. Eat foods that are low in cholesterol.  Saturated and trans fats are bad fats found in foods that will raise your cholesterol. Do not eat foods that are high in saturated and trans fats.  FOODS HIGHER  IN SATURATED AND TRANS FATS  · Dairy products, such as whole milk, eggs, cheese, cream, and butter.   · Fatty meats, such as hot dogs, sausage, and salami.   · Fried foods.   · Trans fats which are found in margarine and pre-made cookies, crackers, and baked goods.   · Tropical oils, such as coconut and palm oils.   Read package labels at the store. Do not buy products that use saturated or trans fats or hydrogenated oils. Find foods labeled:  · Low-fat.   · Low-saturated fat.   · Trans-fat-free.   · Low-cholesterol.   FOODS LOWER IN CHOLESTEROL  ·  Fruit.   · Vegetables.   · Beans, peas, and lentils.   · Fish.   · Lean meat, such as chicken (without skin) or ground turkey.   · Grains, such as barley, rice, couscous, bulgur wheat, and pasta.   · Heart-healthy tub margarine.   PREPARING YOUR FOOD  · Broil, bake, steam, or roast foods. Do not marks food.   · Use non-stick cooking sprays.   · Use lemon or herbs to flavor food instead of using butter or stick margarine.   · Use nonfat yogurt, salsa, or low-fat dressings for salads.   Document Released: 06/18/2013 Document Reviewed: 06/18/2013  ExitCare® Patient Information ©2013 [employer], [employer].    Recommend annual flu vaccine.  Next due in Fall, 2015.  If you decide not to have the flu vaccine, recommend good handwashing and staying out of crowds during flu season.                  MyChart Status: Patient Declined

## 2017-04-21 NOTE — ASSESSMENT & PLAN NOTE
Chronic condition managed with current medical regimen  Stable per review   Continue with current meds  Followed by Presley Black D.O..

## 2017-04-21 NOTE — ASSESSMENT & PLAN NOTE
Chronic condition managed with current medical regimen  2/2017   Cholesterol,Tot 158 100 - 199 mg/dL Final      Triglycerides 120 0 - 149 mg/dL Final     HDL 42 >=40 mg/dL Final     LDL 92 <100 mg/dL Final   Stable per review   Continue with current meds  Followed by Presley Black D.O..

## 2017-07-23 ENCOUNTER — OFFICE VISIT (OUTPATIENT)
Dept: URGENT CARE | Facility: CLINIC | Age: 72
End: 2017-07-23
Payer: MEDICARE

## 2017-07-23 VITALS
RESPIRATION RATE: 18 BRPM | WEIGHT: 190 LBS | HEART RATE: 72 BPM | OXYGEN SATURATION: 95 % | HEIGHT: 63 IN | TEMPERATURE: 98.2 F | BODY MASS INDEX: 33.66 KG/M2 | SYSTOLIC BLOOD PRESSURE: 134 MMHG | DIASTOLIC BLOOD PRESSURE: 70 MMHG

## 2017-07-23 DIAGNOSIS — L03.115 CELLULITIS OF RIGHT LOWER EXTREMITY: ICD-10-CM

## 2017-07-23 PROCEDURE — 99213 OFFICE O/P EST LOW 20 MIN: CPT | Performed by: NURSE PRACTITIONER

## 2017-07-23 RX ORDER — CLINDAMYCIN HYDROCHLORIDE 300 MG/1
300 CAPSULE ORAL 3 TIMES DAILY
Qty: 21 CAP | Refills: 0 | Status: SHIPPED | OUTPATIENT
Start: 2017-07-23 | End: 2017-07-30

## 2017-07-23 ASSESSMENT — ENCOUNTER SYMPTOMS
FEVER: 0
CHILLS: 0

## 2017-07-23 NOTE — MR AVS SNAPSHOT
"        Shikha Swift   2017 3:45 PM   Office Visit   MRN: [id number]    Department:  [facility]   Dept Phone:  [phone]    Description:  Female : 1945   Provider:  Cathey J Hamman, A.P.N.           Reason for Visit      at [facility], [place], looks red and infected       Allergies as of 2017     Allergen Noted Reactions    Hydrocodone 2015   Vomiting, Nausea    FBN=4145      Percocet [Oxycodone-Acetaminophen] 2016   Vomiting, Nausea    MLB=7989    Vicodin [Hydrocodone-Acetaminophen] 2016   Vomiting, Nausea    CBG=4329      You were diagnosed with     Cellulitis of right lower extremity   [115647]         Vital Signs     Blood Pressure Pulse Temperature Respirations Height Weight    134/70 mmHg 72 36.8 °C (98.2 °F) 18 1.6 m (5' 3\") 86.183 kg (190 lb)    Body Mass Index Oxygen Saturation Smoking Status             33.67 kg/m2 95% Never Smoker          Basic Information     Date Of Birth Sex Race Ethnicity Preferred Language    1945 Female White Non- English      Your appointments     Aug 28, 2017 11:00 AM   Established Patient with Presley Black D.O.   [address] [id number] [phone]           You will be receiving a confirmation call a few days before your appointment from our automated call confirmation system.              Problem List              ICD-10-CM Priority Class Noted - Resolved    Dyslipidemia E78.5   2009 - Present    Impaired glucose tolerance R73.02   2009 - Present    Hypothyroid E03.9   2009 - Present    Headache R51   3/1/2012 - Present    Obesity, Class II, BMI 35-39.9, isolated (CMS-HCC) E66.9   6/15/2015 - Present    Essential hypertension I10   2015 - Present    Atypical face pain G50.1   Unknown - Present    Status post total hip replacement, left Z96.642   2016 - Present      Health Maintenance        Date Due Completion " Dates    COLONOSCOPY 11/1/2017 (Originally 4/29/1997) ---    IMM PNEUMOCOCCAL 65+ (ADULT) LOW/MEDIUM RISK SERIES (1 of 2 - PCV13) 3/28/2018 (Originally 4/29/2012) ---    IMM INFLUENZA (1) 3/28/2018 (Originally 9/1/2017) ---    IMM DTaP/Tdap/Td Vaccine (1 - Tdap) 3/28/2018 (Originally 4/29/1966) ---    MAMMOGRAM 11/7/2017 11/7/2016, 11/4/2015, 11/3/2014, 10/30/2013, 10/29/2012, 10/27/2011, 10/25/2010, 10/23/2009, 10/23/2009, 10/6/2008, 10/6/2008, 10/3/2007, 10/3/2007, 10/2/2006, 10/2/2006, 8/4/2005, 2/12/2004, 1/29/2004    BONE DENSITY 11/20/2018 11/20/2013 (Done)    Override on 11/20/2013: Done (Dr. Stevenson )            Current Immunizations     SHINGLES VACCINE 9/15/2015      Below and/or attached are the medications your provider expects you to take. Review all of your home medications and newly ordered medications with your provider and/or pharmacist. Follow medication instructions as directed by your provider and/or pharmacist. Please keep your medication list with you and share with your provider. Update the information when medications are discontinued, doses are changed, or new medications (including over-the-counter products) are added; and carry medication information at all times in the event of emergency situations     Allergies:  HYDROCODONE - Vomiting,Nausea     PERCOCET - Vomiting,Nausea     VICODIN - Vomiting,Nausea               Medications  Valid as of: July 23, 2017 -  4:36 PM    Generic Name Brand Name Tablet Size Instructions for use    AmLODIPine Besylate (Tab) NORVASC 5 MG TAKE 1 TAB BY MOUTH EVERY DAY.        Atorvastatin Calcium (Tab) LIPITOR 10 MG TAKE 1 TABLET BY MOUTH EVERY DAY.        Benazepril HCl (Tab) LOTENSIN 40 MG Take 1 Tab by mouth every day.        Clindamycin HCl (Cap) CLEOCIN 300 MG Take 1 Cap by mouth 3 times a day for 7 days.        Estradiol (Cream) ESTRACE 0.1 MG/GM Insert 1 g in vagina Every Sunday and Wednesday.        Ibuprofen (Cap) Ibuprofen 200 MG Take 1-2 Caps by  mouth 1 time daily as needed.        Levothyroxine Sodium (Tab) SYNTHROID 88 MCG Take 1 Tab by mouth Every morning on an empty stomach.        Mupirocin (Ointment) BACTROBAN 2 % Apply 1 Application to affected area(s) every day for 7 days.        Probiotic Product   Take 15 mL by mouth every day.        Progesterone   Apply 1 Application to affected area(s) every evening. Compounded cream [facility]        .                 Medicines prescribed today were sent to:     [facility] [address] [place] [address] [facility]    [address] [place] [address]    Phone: [phone] Fax: [phone]    Open 24 Hours?: No      Medication refill instructions:       If your prescription bottle indicates you have medication refills left, it is not necessary to call your provider’s office. Please contact your pharmacy and they will refill your medication.    If your prescription bottle indicates you do not have any refills left, you may request refills at any time through one of the following ways: The online [email or web address] system (except Urgent Care), by calling your provider’s office, or by asking your pharmacy to contact your provider’s office with a refill request. Medication refills are processed only during regular business hours and may not be available until the next business day. Your provider may request additional information or to have a follow-up visit with you prior to refilling your medication.   *Please Note: Medication refills are assigned a new Rx number when refilled electronically. Your pharmacy may indicate that no refills were authorized even though a new prescription for the same medication is available at the pharmacy. Please request the medicine by name with the pharmacy before contacting your provider for a refill.           MyChart Status: Patient Declined

## 2017-07-23 NOTE — PROGRESS NOTES
Subjective:      Shikha Swift is a 72 y.o. female who presents with Fall    Past Medical History   Diagnosis Date   • Hypothyroid    • Hypertension    • Headache 3/1/2012   • Atypical face pain      Left sided onset mid-2013   • Infectious disease    • Cold      Social History     Social History   • Marital Status:      Spouse Name: N/A   • Number of Children: N/A   • Years of Education: N/A     Occupational History   • Not on file.     Social History Main Topics   • Smoking status: Never Smoker    • Smokeless tobacco: Never Used   • Alcohol Use: 0.0 oz/week      Comment: 1-2 per month   • Drug Use: No   • Sexual Activity:     Partners: Male     Other Topics Concern   • Not on file     Social History Narrative     Family History   Problem Relation Age of Onset   • Diabetes     • Hypertension     • Stroke     • Cancer     • Diabetes Mother      Allergies: Hydrocodone; Percocet; and Vicodin    This patient is a 72-year-old female who presents today with complaint of wound and redness to the distal medial aspect of the right leg. Patient accidentally fell while at [facility] yesterday and hit the side of her leg on a board. Since then, it is become red with mild tenderness. She has not had any fever, aches, or chills.          Fall  The accident occurred 12 to 24 hours ago. The fall occurred while walking. Distance fallen: GLF. Impact surface: dirt. The volume of blood lost was minimal. Point of impact: left leg. Pain location: left leg. The pain is mild. Pertinent negatives include no fever. Associated symptoms comments: Swelling of the leg. She has tried nothing for the symptoms. The treatment provided no relief.       Review of Systems   Constitutional: Negative for fever and chills.   Musculoskeletal:        Redness, erythema to the distal right leg       All other systems reviewed and are negative      Objective:     /70 mmHg  Pulse 72  Temp(Src) 36.8 °C (98.2 °F)  Resp 18  Ht  "1.6 m (5' 3\")  Wt 86.183 kg (190 lb)  BMI 33.67 kg/m2  SpO2 95%     Physical Exam   Constitutional: She is oriented to person, place, and time. She appears well-developed and well-nourished. No distress.   Musculoskeletal:        Legs:  Neurological: She is alert and oriented to person, place, and time.   Skin: Skin is warm and dry. She is not diaphoretic. There is erythema.   Psychiatric: She has a normal mood and affect. Her behavior is normal.   Vitals reviewed.              Assessment/Plan:   Superficial abrasion, right leg  Cellulitis, right leg  -Tetanus immunization is current  -Bactroban topical  -Clindamycin by mouth  -Warm compresses  -Follow up for increased redness, swelling, pain, streaking, or fever; if these symptoms occur after hours patient was instructed to go to the emergency room for further evaluation  There are no diagnoses linked to this encounter.  -Keep wound clean and dry      "

## 2017-08-08 RX ORDER — AMLODIPINE BESYLATE 5 MG/1
TABLET ORAL
Qty: 90 TAB | Refills: 3 | Status: SHIPPED | OUTPATIENT
Start: 2017-08-08 | End: 2018-08-05 | Stop reason: SDUPTHER

## 2017-11-03 RX ORDER — ATORVASTATIN CALCIUM 10 MG/1
TABLET, FILM COATED ORAL
Qty: 90 TAB | Refills: 3 | Status: SHIPPED | OUTPATIENT
Start: 2017-11-03 | End: 2018-04-05

## 2017-11-09 ENCOUNTER — HOSPITAL ENCOUNTER (OUTPATIENT)
Dept: RADIOLOGY | Facility: MEDICAL CENTER | Age: 72
End: 2017-11-09
Attending: OBSTETRICS & GYNECOLOGY
Payer: MEDICARE

## 2017-11-09 DIAGNOSIS — Z12.31 ENCOUNTER FOR SCREENING MAMMOGRAM FOR MALIGNANT NEOPLASM OF BREAST: ICD-10-CM

## 2017-11-09 PROCEDURE — G0202 SCR MAMMO BI INCL CAD: HCPCS

## 2017-12-13 RX ORDER — LEVOTHYROXINE SODIUM 88 UG/1
TABLET ORAL
Qty: 90 TAB | Refills: 3 | Status: SHIPPED | OUTPATIENT
Start: 2017-12-13 | End: 2019-01-06 | Stop reason: SDUPTHER

## 2017-12-13 RX ORDER — LEVOTHYROXINE SODIUM 88 UG/1
TABLET ORAL
Qty: 90 TAB | Refills: 3 | Status: SHIPPED | OUTPATIENT
Start: 2017-12-13 | End: 2018-12-27

## 2018-04-05 ENCOUNTER — OFFICE VISIT (OUTPATIENT)
Dept: MEDICAL GROUP | Facility: CLINIC | Age: 73
End: 2018-04-05
Payer: MEDICARE

## 2018-04-05 VITALS
OXYGEN SATURATION: 98 % | HEIGHT: 63 IN | SYSTOLIC BLOOD PRESSURE: 142 MMHG | HEART RATE: 72 BPM | TEMPERATURE: 97.7 F | WEIGHT: 197.8 LBS | DIASTOLIC BLOOD PRESSURE: 80 MMHG | BODY MASS INDEX: 35.05 KG/M2 | RESPIRATION RATE: 16 BRPM

## 2018-04-05 DIAGNOSIS — R89.9 ABNORMAL LABORATORY TEST: ICD-10-CM

## 2018-04-05 DIAGNOSIS — I10 ESSENTIAL HYPERTENSION: ICD-10-CM

## 2018-04-05 DIAGNOSIS — E78.5 DYSLIPIDEMIA: ICD-10-CM

## 2018-04-05 PROCEDURE — 99214 OFFICE O/P EST MOD 30 MIN: CPT | Performed by: INTERNAL MEDICINE

## 2018-04-05 RX ORDER — BENAZEPRIL HYDROCHLORIDE 40 MG/1
40 TABLET ORAL DAILY
Qty: 90 TAB | Refills: 3 | Status: SHIPPED | OUTPATIENT
Start: 2018-04-05 | End: 2019-04-01 | Stop reason: SDUPTHER

## 2018-04-05 RX ORDER — ATORVASTATIN CALCIUM 20 MG/1
20 TABLET, FILM COATED ORAL DAILY
Qty: 90 TAB | Refills: 3 | Status: SHIPPED | OUTPATIENT
Start: 2018-04-05 | End: 2018-04-09

## 2018-04-05 ASSESSMENT — PATIENT HEALTH QUESTIONNAIRE - PHQ9: CLINICAL INTERPRETATION OF PHQ2 SCORE: 0

## 2018-04-05 NOTE — PROGRESS NOTES
CC: Shikha Swift is a 72 y.o. female is suffering from   Chief Complaint   Patient presents with   • Medication Refill         SUBJECTIVE:  1. Essential hypertension  Lauryn is here for follow-up, needs refills of her benazepril, orders written    2. Abnormal laboratory test  Patient and I have discussed that she has multiple abnormal lab tests and I want to follow-up on an concerned about Glucose tolerance possibly diabetes. We discussed the need to lose weight     3. Dyslipidemia  Dyslipidemia. I've recommended after reviewing her CT to rule out pulmonary embolus that she has significant cardiovascular plaque burden. I've asked her to increase her Lipitor from 10-20 mg        Past social, family, history: , Ravinder  Social History   Substance Use Topics   • Smoking status: Never Smoker   • Smokeless tobacco: Never Used   • Alcohol use 0.0 oz/week      Comment: 1-2 per month         MEDICATIONS:    Current Outpatient Prescriptions:   •  benazepril (LOTENSIN) 40 MG tablet, Take 1 Tab by mouth every day., Disp: 90 Tab, Rfl: 3  •  atorvastatin (LIPITOR) 20 MG Tab, Take 1 Tab by mouth every day., Disp: 90 Tab, Rfl: 3  •  levothyroxine (SYNTHROID) 88 MCG Tab, TAKE 1 TABLET BY MOUTH EVERY MORNING ON AN EMPTY STOMACH, Disp: 90 Tab, Rfl: 3  •  amlodipine (NORVASC) 5 MG Tab, TAKE 1 TABLET BY MOUTH EVERY DAY, Disp: 90 Tab, Rfl: 3  •  Probiotic Product (ACIDOPHILUS PROBIOTIC BLEND PO), Take 15 mL by mouth every day., Disp: , Rfl:   •  estradiol (ESTRACE VAGINAL) 0.1 MG/GM vaginal cream, Insert 1 g in vagina Every Sunday and Wednesday., Disp: , Rfl:   •  Ibuprofen (ADVIL) 200 MG CAPS, Take 1-2 Caps by mouth 1 time daily as needed., Disp: , Rfl:   •  PROGESTERONE, Apply 1 Application to affected area(s) every evening. Compounded cream [facility], Disp: , Rfl:   •  levothyroxine (SYNTHROID) 88 MCG Tab, TAKE 1 TABLET BY MOUTH EVERY MORNING ON AN EMPTY STOMACH, Disp: 90 Tab, Rfl: 3    PROBLEMS:  Patient  "Active Problem List    Diagnosis Date Noted   • Status post total hip replacement, left 02/16/2016   • Atypical face pain    • Essential hypertension 08/11/2015   • Obesity, Class II, BMI 35-39.9, isolated 06/15/2015   • Headache 03/01/2012   • Dyslipidemia 05/22/2009   • Impaired glucose tolerance 05/22/2009   • Hypothyroid 05/22/2009       REVIEW OF SYSTEMS:  Gen.:  No Nausea, Vomiting, fever, Chills.  Heart: No chest pain.  Lungs:  No shortness of Breath.  Psychological: Mack unusual Anxiety depression     PHYSICAL EXAM   Constitutional: Alert, cooperative, not in acute distress.  Cardiovascular:  Rate Rhythm is regular without murmurs rubs clicks.     Thorax & Lungs: Clear to auscultation, no wheezing, rhonchi, or rales  HENT: Normocephalic, Atraumatic.  Eyes: PERRLA, EOMI, Conjunctiva normal.   Neck: Trachia is midline no swelling of the thyroid.   Lymphatic: No lymphadenopathy noted.   Neurologic: Alert & oriented x 3, cranial nerves II through XII are intact, Normal motor function, Normal sensory function, No focal deficits noted.   Psychiatric: Affect normal, Judgment normal, Mood normal.     VITAL SIGNS:/80   Pulse 72   Temp 36.5 °C (97.7 °F)   Resp 16   Ht 1.6 m (5' 3\")   Wt 89.7 kg (197 lb 12.8 oz)   SpO2 98%   Breastfeeding? No   BMI 35.04 kg/m²     Labs: Reviewed    Assessment:                                                     Plan:    1. Essential hypertension  Continue Lotensin 40 mg new prescription written  - benazepril (LOTENSIN) 40 MG tablet; Take 1 Tab by mouth every day.  Dispense: 90 Tab; Refill: 3    2. Abnormal laboratory test  Check vitamin D also comprehensive metabolic panel done fasting because of concerns about    Glucose tolerance  - VITAMIN D,25 HYDROXY; Future  - COMP METABOLIC PANEL; Future    3. Dyslipidemia  Patient has significant plaque burden on her CT to rule out pulmonary embolus. Asked the patient increase Lipitor from 10-20 mg  - atorvastatin (LIPITOR) 20 MG " Tab; Take 1 Tab by mouth every day.  Dispense: 90 Tab; Refill: 3

## 2018-04-09 ENCOUNTER — TELEPHONE (OUTPATIENT)
Dept: MEDICAL GROUP | Facility: CLINIC | Age: 73
End: 2018-04-09

## 2018-04-09 DIAGNOSIS — E78.5 DYSLIPIDEMIA: ICD-10-CM

## 2018-04-09 RX ORDER — ATORVASTATIN CALCIUM 10 MG/1
10 TABLET, FILM COATED ORAL DAILY
Qty: 90 TAB | Refills: 3
Start: 2018-04-09 | End: 2019-07-12

## 2018-04-09 NOTE — TELEPHONE ENCOUNTER
1. Caller Name: Lauryn                                         Call Back Number: [phone]      Patient approves a detailed voicemail message: yes    Pt is requesting a dosage decrease of Atorvastatin.  She says the bigger dose is making her nauseas and giving her body aches.

## 2018-04-09 NOTE — TELEPHONE ENCOUNTER
Telephone call returned, answering machine only, asked the patient to cut her atorvastatin and have down to 10 mg

## 2018-06-11 ENCOUNTER — PATIENT OUTREACH (OUTPATIENT)
Dept: HEALTH INFORMATION MANAGEMENT | Facility: OTHER | Age: 73
End: 2018-06-11

## 2018-06-11 NOTE — PROGRESS NOTES
Outcome: no answer    Please transfer to Patient Outreach Team at [phone] when patient returns call.

## 2018-08-07 RX ORDER — AMLODIPINE BESYLATE 5 MG/1
TABLET ORAL
Qty: 90 TAB | Refills: 3 | Status: SHIPPED | OUTPATIENT
Start: 2018-08-07 | End: 2019-08-04 | Stop reason: SDUPTHER

## 2018-08-07 NOTE — TELEPHONE ENCOUNTER
Was the patient seen in the last year in this department? Yes    Does patient have an active prescription for medications requested? No     Received Request Via: Pharmacy     Last visit:4/5/18

## 2018-09-07 ENCOUNTER — HOSPITAL ENCOUNTER (OUTPATIENT)
Dept: LAB | Facility: MEDICAL CENTER | Age: 73
End: 2018-09-07
Attending: INTERNAL MEDICINE
Payer: MEDICARE

## 2018-09-07 DIAGNOSIS — R89.9 ABNORMAL LABORATORY TEST: ICD-10-CM

## 2018-09-07 LAB
25(OH)D3 SERPL-MCNC: 8 NG/ML (ref 30–100)
ALBUMIN SERPL BCP-MCNC: 4.2 G/DL (ref 3.2–4.9)
ALBUMIN/GLOB SERPL: 1.5 G/DL
ALP SERPL-CCNC: 82 U/L (ref 30–99)
ALT SERPL-CCNC: 66 U/L (ref 2–50)
ANION GAP SERPL CALC-SCNC: 6 MMOL/L (ref 0–11.9)
AST SERPL-CCNC: 46 U/L (ref 12–45)
BILIRUB SERPL-MCNC: 0.6 MG/DL (ref 0.1–1.5)
BUN SERPL-MCNC: 19 MG/DL (ref 8–22)
CALCIUM SERPL-MCNC: 10.9 MG/DL (ref 8.5–10.5)
CHLORIDE SERPL-SCNC: 111 MMOL/L (ref 96–112)
CO2 SERPL-SCNC: 23 MMOL/L (ref 20–33)
CREAT SERPL-MCNC: 0.6 MG/DL (ref 0.5–1.4)
FASTING STATUS PATIENT QL REPORTED: NORMAL
GLOBULIN SER CALC-MCNC: 2.8 G/DL (ref 1.9–3.5)
GLUCOSE SERPL-MCNC: 111 MG/DL (ref 65–99)
POTASSIUM SERPL-SCNC: 3.7 MMOL/L (ref 3.6–5.5)
PROT SERPL-MCNC: 7 G/DL (ref 6–8.2)
SODIUM SERPL-SCNC: 140 MMOL/L (ref 135–145)

## 2018-09-07 PROCEDURE — 82306 VITAMIN D 25 HYDROXY: CPT | Mod: GA

## 2018-09-07 PROCEDURE — 36415 COLL VENOUS BLD VENIPUNCTURE: CPT | Mod: GA

## 2018-09-07 PROCEDURE — 80053 COMPREHEN METABOLIC PANEL: CPT

## 2018-09-10 ENCOUNTER — TELEPHONE (OUTPATIENT)
Dept: MEDICAL GROUP | Facility: LAB | Age: 73
End: 2018-09-10

## 2018-09-10 NOTE — TELEPHONE ENCOUNTER
ANNUAL WELLNESS VISIT PRE-VISIT PLANNING WITHOUT OUTREACH  Future Appointments       Provider Department Center    9/17/2018 10:00 AM Presley Black D.O.; [facility]           1.  Reviewed note from last office visit with PCP: YES    2.  If any orders were placed at last visit, do we have Results/Consult Notes?        •  Labs - Labs ordered, completed on 9/7/18 and results are in chart.       •  Imaging - Imaging was not ordered at last office visit.       •  Referrals - No referrals were ordered at last office visit.    3.  Immunizations were updated in Epic using WebIZ?: Epic matches WebIZ       •  WebIZ Recommendations: FLU, TDAP and SHINGRIX (Shingles)        •  Is patient due for Tdap? NO       •  Is patient due for Shingles? NO     4.  Patient is due for the following Health Maintenance Topics:   Health Maintenance Due   Topic Date Due   • IMM DTaP/Tdap/Td Vaccine (1 - Tdap) 04/29/1966   • COLONOSCOPY  04/29/1997   • IMM PNEUMOCOCCAL 65+ (ADULT) LOW/MEDIUM RISK SERIES (1 of 2 - PCV13) 04/29/2012   • IMM ZOSTER VACCINES (2 of 3) 11/10/2015   • Annual Wellness Visit  04/22/2018   • IMM INFLUENZA (1) 09/01/2018       - Patient has completed SHINGRIX (Shingles) Immunization(s) per WebIZ. Chart has been updated.    5.  Reviewed/Updated the following with patient:       •   Preferred Pharmacy? YES       •   Preferred Lab? YES       •   Preferred Communication? YES       •   Allergies? YES       •   Medications? YES. Was Abstract Encounter opened and chart updated? YES       •   Social History? YES. Was Abstract Encounter opened and chart updated? YES       •   Family History (document living status of immediate family members and if + hx of  cancer, diabetes, hypertension, hyperlipidemia, heart attack, stroke) YES. Was Abstract Encounter opened and chart updated? YES    6.  Care Team Updated:       •   DME Company (gait device, O2, CPAP, etc.): YES       •    Other Specialists (eye doctor, derm, GYN, cardiology, endo, etc): YES    7.  Patient has the following Care Path diagnoses on Problem List:  NONE    8.  MDX printed and highlighted for Provider? NO    9.  Patient was advised: “This is a free wellness visit. The provider will screen for medical conditions to help you stay healthy. If you have other concerns to address you may be asked to discuss these at a separate visit or there may be an additional fee.”     10.  Patient was informed to arrive 15 min prior to their scheduled appointment and bring in their medication bottles.

## 2018-09-13 DIAGNOSIS — E55.9 VITAMIN D DEFICIENCY: ICD-10-CM

## 2018-09-17 ENCOUNTER — OFFICE VISIT (OUTPATIENT)
Dept: MEDICAL GROUP | Facility: LAB | Age: 73
End: 2018-09-17
Payer: MEDICARE

## 2018-09-17 VITALS
DIASTOLIC BLOOD PRESSURE: 78 MMHG | RESPIRATION RATE: 12 BRPM | OXYGEN SATURATION: 95 % | HEIGHT: 63 IN | TEMPERATURE: 98 F | WEIGHT: 192.6 LBS | HEART RATE: 70 BPM | SYSTOLIC BLOOD PRESSURE: 122 MMHG | BODY MASS INDEX: 34.12 KG/M2

## 2018-09-17 DIAGNOSIS — Z12.11 SCREENING FOR COLORECTAL CANCER: ICD-10-CM

## 2018-09-17 DIAGNOSIS — E78.5 DYSLIPIDEMIA: ICD-10-CM

## 2018-09-17 DIAGNOSIS — R73.02 IMPAIRED GLUCOSE TOLERANCE: ICD-10-CM

## 2018-09-17 DIAGNOSIS — E03.8 OTHER SPECIFIED HYPOTHYROIDISM: ICD-10-CM

## 2018-09-17 DIAGNOSIS — E55.9 VITAMIN D DEFICIENCY: ICD-10-CM

## 2018-09-17 DIAGNOSIS — Z12.12 SCREENING FOR COLORECTAL CANCER: ICD-10-CM

## 2018-09-17 DIAGNOSIS — E66.9 OBESITY, CLASS II, BMI 35-39.9, ISOLATED: ICD-10-CM

## 2018-09-17 PROCEDURE — G0439 PPPS, SUBSEQ VISIT: HCPCS | Performed by: INTERNAL MEDICINE

## 2018-09-17 ASSESSMENT — ENCOUNTER SYMPTOMS: GENERAL WELL-BEING: EXCELLENT

## 2018-09-17 ASSESSMENT — ACTIVITIES OF DAILY LIVING (ADL): BATHING_REQUIRES_ASSISTANCE: 0

## 2018-09-17 ASSESSMENT — PATIENT HEALTH QUESTIONNAIRE - PHQ9: CLINICAL INTERPRETATION OF PHQ2 SCORE: 0

## 2018-09-17 NOTE — PROGRESS NOTES
Chief Complaint   Patient presents with   • Annual Wellness Visit         HPI:  Shikha is a 73 y.o. here for Medicare Annual Wellness Visit         Patient Active Problem List    Diagnosis Date Noted   • Vitamin D deficiency 09/13/2018   • Status post total hip replacement, left 02/16/2016   • Atypical face pain    • Essential hypertension 08/11/2015   • Obesity, Class II, BMI 35-39.9, isolated 06/15/2015   • Headache 03/01/2012   • Dyslipidemia 05/22/2009   • Impaired glucose tolerance 05/22/2009   • Hypothyroid 05/22/2009       Current Outpatient Prescriptions   Medication Sig Dispense Refill   • NON SPECIFIED You are low on Vitamin D please start 2,000 IU over the counter, Nature Made is a good brand!  Regards, Presley Black, DO 1 Each ea   • amLODIPine (NORVASC) 5 MG Tab TAKE 1 TABLET BY MOUTH EVERY DAY 90 Tab 3   • atorvastatin (LIPITOR) 10 MG Tab Take 1 Tab by mouth every day. 90 Tab 3   • benazepril (LOTENSIN) 40 MG tablet Take 1 Tab by mouth every day. 90 Tab 3   • levothyroxine (SYNTHROID) 88 MCG Tab TAKE 1 TABLET BY MOUTH EVERY MORNING ON AN EMPTY STOMACH 90 Tab 3   • Probiotic Product (ACIDOPHILUS PROBIOTIC BLEND PO) Take 15 mL by mouth every day.     • estradiol (ESTRACE VAGINAL) 0.1 MG/GM vaginal cream Insert 1 g in vagina Every Sunday and Wednesday.     • Ibuprofen (ADVIL) 200 MG CAPS Take 1-2 Caps by mouth 1 time daily as needed.     • PROGESTERONE Apply 1 Application to affected area(s) every evening. Compounded cream [facility]     • levothyroxine (SYNTHROID) 88 MCG Tab TAKE 1 TABLET BY MOUTH EVERY MORNING ON AN EMPTY STOMACH 90 Tab 3     No current facility-administered medications for this visit.         Patient is taking medications as noted in medication list.  Current supplements as per medication list.     Allergies: Hydrocodone; Percocet [oxycodone-acetaminophen]; and Vicodin [hydrocodone-acetaminophen]    Current social contact/activities: loving to go to [place] [employer], going out  dinner with spouse, going to [employer]      Is patient current with immunizations? No, due for FLU, PREVNAR (PCV13)  and SHINGRIX (Shingles). Patient is interested in receiving NONE today.    She  reports that she has never smoked. She has never used smokeless tobacco. She reports that she drinks alcohol. She reports that she does not use drugs.  Counseling given: Yes        DPA/Advanced directive: Patient does not have an Advanced Directive.  A packet and workshop information was given on Advanced Directives.    ROS:    Gait: Uses no assistive device    Ostomy: No    Other tubes: No    Amputations: No    Chronic oxygen use No    Last eye exam 2017    Wears hearing aids: No    : Reports urinary leakage during the last 6 months that has not interfered at all with their daily activities or sleep.       Depression Screening    Little interest or pleasure in doing things?  0 - not at all  Feeling down, depressed, or hopeless? 0 - not at all  Patient Health Questionnaire Score: 0    If depressive symptoms identified deferred to follow up visit unless specifically addressed in assessment and plan.    Interpretation of PHQ-9 Total Score   Score Severity   1-4 No Depression   5-9 Mild Depression   10-14 Moderate Depression   15-19 Moderately Severe Depression   20-27 Severe Depression    Screening for Cognitive Impairment    Three Minute Recall (leader, season, table)  1/3    Kimani clock face with all 12 numbers and set the hands to show 10 past 11.  Yes 5/5  If cognitive concerns identified, deferred for follow up unless specifically addressed in assessment and plan.    Fall Risk Assessment    Has the patient had two or more falls in the last year or any fall with injury in the last year?  No  If fall risk identified, deferred for follow up unless specifically addressed in assessment and plan.    Safety Assessment    Throw rugs on floor.  Yes  Handrails on all stairs.  Yes  Good lighting in all hallways.  Yes  Difficulty  hearing.  No  Patient counseled about all safety risks that were identified.    Functional Assessment ADLs    Are there any barriers preventing you from cooking for yourself or meeting nutritional needs?  No.    Are there any barriers preventing you from driving safely or obtaining transportation?  No.    Are there any barriers preventing you from using a telephone or calling for help?  No.    Are there any barriers preventing you from shopping?  No.    Are there any barriers preventing you from taking care of your own finances?  No.    Are there any barriers preventing you from managing your medications?  No.    Are there any barriers preventing you from showering, bathing or dressing yourself?  No.    Are you currently engaging in any exercise or physical activity?  Yes.  Going to Gym once a week. Walking dog once a week in morning.  What is your perception of your health?  Excellent.    Health Maintenance Summary                IMM DTaP/Tdap/Td Vaccine Overdue 4/29/1966     IMM PNEUMOCOCCAL 65+ (ADULT) LOW/MEDIUM RISK SERIES Overdue 4/29/2012     IMM ZOSTER VACCINES Overdue 11/10/2015      Done 9/15/2015 Imm Admin: Zoster Vaccine Live (ZVL) (Zostavax)    COLON CANCER SCREENING ANNUAL FIT Overdue 1/28/2017      Done 1/28/2016 OCCULT BLOOD FECES IMMUNOASSAY    Annual Wellness Visit Overdue 4/22/2018      Done 4/21/2017 Visit Dx: Medicare annual wellness visit, initial    IMM INFLUENZA Overdue 9/1/2018     MAMMOGRAM Next Due 11/9/2018      Done 11/9/2017 MA-MAMMO SCREENING BILAT W/TOMOSYNTHESIS W/CAD     Patient has more history with this topic...    BONE DENSITY Next Due 11/20/2018      Done 11/20/2013 Dr. Perea.           Patient Care Team:  Presley Black D.O. as PCP - General  Eduar Gupta M.D. as Consulting Physician (Ophthalmology)  Gabriela Perea M.D. as Consulting Physician (OB/Gyn)    Social History   Substance Use Topics   • Smoking status: Never Smoker   • Smokeless tobacco: Never Used   •  "Alcohol use 0.0 oz/week      Comment: 1-2 per month     Family History   Problem Relation Age of Onset   • Diabetes Mother    • Diabetes Unknown    • Hypertension Unknown    • Stroke Unknown    • Cancer Unknown      She  has a past medical history of Atypical face pain; Cold (); Headache(784.0) (3/1/2012); Hypertension; Hypothyroid; and Infectious disease.   Past Surgical History:   Procedure Laterality Date   • HIP ARTHROPLASTY TOTAL Left 2/16/2016    Procedure: HIP ARTHROPLASTY TOTAL;  Surgeon: Addy Chen M.D.;  Location: SURGERY [facility];  Service:    • TRIGGER FINGER RELEASE  11/17/2009    Performed by JOSE ANGEL SRIVASTAVA at SURGERY [facility] ORS   • TRIGGER FINGER RELEASE  3/2009    left index and middle   • PB PART EXCIS PLANTAR FASCIA  2004    left   • THYROIDECTOMY  as child    goiter   • TONSILLECTOMY  as child   • US-NEEDLE CORE BX-BREAST PANEL      right           Exam:     Blood pressure 122/78, pulse 70, temperature 36.7 °C (98 °F), resp. rate 12, height 1.6 m (5' 3\"), weight 87.4 kg (192 lb 9.6 oz), SpO2 95 %. Body mass index is 34.12 kg/m².    Hearing good.    Dentition fair  Alert, oriented in no acute distress.  Eye contact is good, speech goal directed, affect calm       Assessment and Plan. The following treatment and monitoring plan is recommended:     1. Screening for colorectal cancer  Cologuard report signed.   - COLOGUARD (FIT DNA)    2. Impaired glucose tolerance  Stable.     3. Other specified hypothyroidism  Recheck 3 months  - FREE THYROXINE; Future  - TSH; Future    4. Dyslipidemia  Stable.   - COMP METABOLIC PANEL; Future  - CBC WITH DIFFERENTIAL; Future  - LIPID PROFILE; Future    5. Obesity, Class II, BMI 35-39.9, isolated  Discussed diet exercise    6. Vitamin D deficiency  Stable recheck  - VITAMIN D,25 HYDROXY; Future      Services suggested: No services needed at this time  Health Care Screening recommendations as per orders if indicated.  Referrals offered: " PT/OT/Nutrition counseling/Behavioral Health/Smoking cessation as per orders if indicated.    Discussion today about general wellness and lifestyle habits:    · Prevent falls and reduce trip hazards; Cautioned about securing or removing rugs.  · Have a working fire alarm and carbon monoxide detector;   · Engage in regular physical activity and social activities       Follow-up: No Follow-up on file.

## 2018-10-03 ENCOUNTER — OFFICE VISIT (OUTPATIENT)
Dept: URGENT CARE | Facility: CLINIC | Age: 73
End: 2018-10-03
Payer: MEDICARE

## 2018-10-03 VITALS
DIASTOLIC BLOOD PRESSURE: 78 MMHG | OXYGEN SATURATION: 98 % | HEIGHT: 63 IN | RESPIRATION RATE: 16 BRPM | HEART RATE: 70 BPM | SYSTOLIC BLOOD PRESSURE: 142 MMHG | BODY MASS INDEX: 34.02 KG/M2 | WEIGHT: 192 LBS | TEMPERATURE: 97.8 F

## 2018-10-03 DIAGNOSIS — L30.9 DERMATITIS: ICD-10-CM

## 2018-10-03 PROCEDURE — 99213 OFFICE O/P EST LOW 20 MIN: CPT | Performed by: NURSE PRACTITIONER

## 2018-10-03 RX ORDER — TRIAMCINOLONE ACETONIDE 1 MG/G
1 CREAM TOPICAL 2 TIMES DAILY
Qty: 1 TUBE | Refills: 0 | Status: SHIPPED | OUTPATIENT
Start: 2018-10-03 | End: 2018-10-13

## 2018-10-03 NOTE — PROGRESS NOTES
Subjective:      Shikha Swift is a 73 y.o. female who presents with Rash (x2wks, Neck area, pt. states she thought it was a sunburn and has been using topical cream on it but nothing has worked and it is still there.)    Past Medical History:   Diagnosis Date   • Atypical face pain     Left sided onset mid-2013   • Cold    • Headache(784.0) 3/1/2012   • Hypertension    • Hypothyroid    • Infectious disease      Social History     Social History   • Marital status:      Spouse name: N/A   • Number of children: N/A   • Years of education: N/A     Occupational History   • Not on file.     Social History Main Topics   • Smoking status: Never Smoker   • Smokeless tobacco: Never Used   • Alcohol use 0.0 oz/week      Comment: 1-2 per month   • Drug use: No   • Sexual activity: Not Currently     Partners: Male     Other Topics Concern   • Not on file     Social History Narrative   • No narrative on file     Family History   Problem Relation Age of Onset   • Diabetes Mother    • Diabetes Unknown    • Hypertension Unknown    • Stroke Unknown    • Cancer Unknown        Allergies: Hydrocodone; Percocet [oxycodone-acetaminophen]; and Vicodin [hydrocodone-acetaminophen]    Patient is a 73-year-old female who presents today with complaint of patchy mildly pruritic rash to the anterior chest.  Rash is localized to this area and has not spread.  She states it started 1 week ago.  She suspects a photosensitivity reaction to sunlight.        Rash   This is a new problem. The current episode started in the past 7 days. The problem is unchanged. Location: neck. The rash is characterized by dryness, peeling, swelling, redness and itchiness. She was exposed to nothing. Past treatments include topical steroids. The treatment provided no relief.       Review of Systems   Skin: Positive for itching and rash.   All other systems reviewed and are negative.         Objective:     /78 (BP Location: Left arm,  "Patient Position: Sitting, BP Cuff Size: Adult)   Pulse 70   Temp 36.6 °C (97.8 °F) (Temporal)   Resp 16   Ht 1.6 m (5' 3\")   Wt 87.1 kg (192 lb)   SpO2 98%   BMI 34.01 kg/m²      Physical Exam   Constitutional: She is oriented to person, place, and time. She appears well-developed.   Neck:       Red patchy rash to the anterior neck and upper chest.  No excoriations noted, patient states area is slightly pruritic.   Neurological: She is alert and oriented to person, place, and time.   Skin: Skin is warm. Capillary refill takes less than 2 seconds. Rash noted.   Psychiatric: She has a normal mood and affect. Her behavior is normal. Judgment and thought content normal.   Vitals reviewed.              Assessment/Plan:   Dermatits  -topical kenalog  -follow up for persistent or worwening of symptoms.   There are no diagnoses linked to this encounter.      "

## 2018-10-15 RX ORDER — ATORVASTATIN CALCIUM 10 MG/1
TABLET, FILM COATED ORAL
Qty: 90 TAB | Refills: 2 | Status: SHIPPED | OUTPATIENT
Start: 2018-10-15 | End: 2018-12-27

## 2018-11-12 ENCOUNTER — HOSPITAL ENCOUNTER (OUTPATIENT)
Dept: RADIOLOGY | Facility: MEDICAL CENTER | Age: 73
End: 2018-11-12
Attending: OBSTETRICS & GYNECOLOGY
Payer: MEDICARE

## 2018-11-12 DIAGNOSIS — Z12.31 VISIT FOR SCREENING MAMMOGRAM: ICD-10-CM

## 2018-11-12 PROCEDURE — 77067 SCR MAMMO BI INCL CAD: CPT

## 2018-12-27 ENCOUNTER — OFFICE VISIT (OUTPATIENT)
Dept: MEDICAL GROUP | Facility: LAB | Age: 73
End: 2018-12-27
Payer: MEDICARE

## 2018-12-27 VITALS
DIASTOLIC BLOOD PRESSURE: 74 MMHG | BODY MASS INDEX: 34.02 KG/M2 | SYSTOLIC BLOOD PRESSURE: 136 MMHG | HEART RATE: 72 BPM | OXYGEN SATURATION: 96 % | WEIGHT: 192 LBS | TEMPERATURE: 97.9 F | RESPIRATION RATE: 16 BRPM | HEIGHT: 63 IN

## 2018-12-27 DIAGNOSIS — E03.8 OTHER SPECIFIED HYPOTHYROIDISM: ICD-10-CM

## 2018-12-27 DIAGNOSIS — J02.9 PHARYNGITIS, UNSPECIFIED ETIOLOGY: Primary | ICD-10-CM

## 2018-12-27 DIAGNOSIS — E55.9 VITAMIN D DEFICIENCY: ICD-10-CM

## 2018-12-27 LAB
INT CON NEG: NEGATIVE
INT CON POS: POSITIVE
S PYO AG THROAT QL: NEGATIVE

## 2018-12-27 PROCEDURE — 99214 OFFICE O/P EST MOD 30 MIN: CPT | Performed by: INTERNAL MEDICINE

## 2018-12-27 PROCEDURE — 87880 STREP A ASSAY W/OPTIC: CPT | Performed by: INTERNAL MEDICINE

## 2018-12-27 RX ORDER — AZITHROMYCIN 250 MG/1
250 TABLET, FILM COATED ORAL DAILY
Qty: 6 TAB | Refills: 0 | Status: SHIPPED | OUTPATIENT
Start: 2018-12-27 | End: 2019-01-01

## 2018-12-27 RX ORDER — BENZONATATE 100 MG/1
100 CAPSULE ORAL 3 TIMES DAILY PRN
Qty: 60 CAP | Refills: 0 | Status: SHIPPED | OUTPATIENT
Start: 2018-12-27 | End: 2021-07-22

## 2018-12-27 NOTE — PROGRESS NOTES
Chief Complaint   Patient presents with   • Pharyngitis       Subjective:     HPI:   Shikha presents today with the following.    Pharyngitis  New to discuss, uncontrolled problem.  She reported 5 days history of being sick.  Started with a sore throat and some nasal congestion without drainage or postnasal drip.  Sore throat was very severe that she was not able to eat or drink yesterday.  She does have a dry cough without sputum production.  She denies sinus pain/pressure.  Denies fever/chills.  Eyes/ear problem.   Reported a sick contact with her daughter-in-law had similar symptoms and her  got sick around the same time.  She denies chest pain, shortness of breath, dizziness or lower extremity edema.  She is not a smoker.    Hypothyroid  New to me, chronic controlled.  She has hypothyroidism for many years and currently on levothyroxine 88 mcg daily.  She takes it early in the morning and an empty stomach without other food or beverages.  She seems to be clinically euthyroid.  Her most recent TSH was normal in February 2017.    Vitamin D deficiency  New to me, chronic uncontrolled problem.  Her most recent vitamin D level was 8 in September 2018.  Since then, she started taking over-the-counter vitamin D with nature made vitamins 1000 IUs daily.      Patient Active Problem List    Diagnosis Date Noted   • Pharyngitis 12/27/2018   • Vitamin D deficiency 09/13/2018   • Status post total hip replacement, left 02/16/2016   • Atypical face pain    • Essential hypertension 08/11/2015   • Obesity, Class II, BMI 35-39.9, isolated 06/15/2015   • Headache 03/01/2012   • Dyslipidemia 05/22/2009   • Impaired glucose tolerance 05/22/2009   • Hypothyroid 05/22/2009       Current Outpatient Prescriptions   Medication Sig Dispense Refill   • azithromycin (ZITHROMAX Z-KENNY) 250 MG Tab Take 1 Tab by mouth every day for 5 days. Take 2 tabs on day 1 6 Tab 0   • benzonatate (TESSALON) 100 MG Cap Take 1 Cap by mouth 3 times a  day as needed for Cough. 60 Cap 0   • amLODIPine (NORVASC) 5 MG Tab TAKE 1 TABLET BY MOUTH EVERY DAY 90 Tab 3   • atorvastatin (LIPITOR) 10 MG Tab Take 1 Tab by mouth every day. 90 Tab 3   • benazepril (LOTENSIN) 40 MG tablet Take 1 Tab by mouth every day. 90 Tab 3   • levothyroxine (SYNTHROID) 88 MCG Tab TAKE 1 TABLET BY MOUTH EVERY MORNING ON AN EMPTY STOMACH 90 Tab 3   • Probiotic Product (ACIDOPHILUS PROBIOTIC BLEND PO) Take 15 mL by mouth every day.     • estradiol (ESTRACE VAGINAL) 0.1 MG/GM vaginal cream Insert 1 g in vagina Every Sunday and Wednesday.     • PROGESTERONE Apply 1 Application to affected area(s) every evening. Compounded cream [facility]     • NON SPECIFIED You are low on Vitamin D please start 2,000 IU over the counter, Nature Made is a good brand!  Regards, Presley Black, DO 1 Each ea   • Ibuprofen (ADVIL) 200 MG CAPS Take 1-2 Caps by mouth 1 time daily as needed.       No current facility-administered medications for this visit.        Allergies as of 12/27/2018 - Reviewed 12/27/2018   Allergen Reaction Noted   • Hydrocodone Vomiting and Nausea 07/29/2015   • Percocet [oxycodone-acetaminophen] Vomiting and Nausea 02/16/2016   • Vicodin [hydrocodone-acetaminophen] Vomiting and Nausea 02/16/2016        Past Medical History:   Diagnosis Date   • Atypical face pain     Left sided onset mid-2013   • Cold    • Headache(784.0) 3/1/2012   • Hypertension    • Hypothyroid    • Infectious disease        Past Surgical History:   Procedure Laterality Date   • HIP ARTHROPLASTY TOTAL Left 2/16/2016    Procedure: HIP ARTHROPLASTY TOTAL;  Surgeon: Addy Chen M.D.;  Location: SURGERY [facility];  Service:    • TRIGGER FINGER RELEASE  11/17/2009    Performed by JOSE ANGEL SRIVASTAVA at SURGERY [facility]S   • TRIGGER FINGER RELEASE  3/2009    left index and middle   • PB PART EXCIS PLANTAR FASCIA  2004    left   • THYROIDECTOMY  as child    goiter   • TONSILLECTOMY  as child   •  "US-NEEDLE CORE BX-BREAST PANEL      right       Social History   Substance Use Topics   • Smoking status: Never Smoker   • Smokeless tobacco: Never Used   • Alcohol use 0.0 oz/week      Comment: 1-2 per month       Family History   Problem Relation Age of Onset   • Diabetes Mother    • Diabetes Unknown    • Hypertension Unknown    • Stroke Unknown    • Cancer Unknown        ROS:     - Constitutional: Negative for fever, chills, unexpected weight change, and fatigue/generalized weakness.     - HEENT: Per HPI.    - Respiratory: Per HPI.    - Cardiovascular: Negative for chest pain or palpitations.      - Gastrointestinal: Negative for heartburn, nausea, vomiting, abdominal pain, diarrhea or constipation.     - Genitourinary: Negative for dysuria, polyuria or urinary urgency.    - Musculoskeletal: Negative for myalgias, back pain, and joint pain.     - Skin: Negative for rash, itching, cyanotic skin color change.     - Psychiatric/Behavioral: Negative for depression or suicidal/homicidal ideation.       Physical Exam:     Blood pressure 136/74, pulse 72, temperature 36.6 °C (97.9 °F), temperature source Temporal, resp. rate 16, height 1.6 m (5' 3\"), weight 87.1 kg (192 lb), SpO2 96 %, not currently breastfeeding. Body mass index is 34.01 kg/m².   Gen:         Alert and oriented, No apparent distress.  HEENT: Eyes conjunctiva is clear, lids without ptosis, pupils equal round and reactive to light and accommodation.  Ears normal shape and contour, canals are clear bilaterally, TMs with good light reflex and appear normal.  Nasal mucosa pale and edematous with clear rhinorrhea.  Pharynx with diffuse erythema, edema but no exudates..  Sinuses (frontal and maxillary) nontender to palpation.  Neck:        No Lymphadenopathy or Bruits.  Lungs:     Clear to auscultation bilaterally  CV:          Regular rate and rhythm. No murmurs, rubs or gallops.               Ext:          No clubbing, cyanosis, edema.    Data:   LABS: " 9/2018: Results reviewed and discussed with the patient, questions answered.      Assessment and Plan:     73 y.o. female with the following issues.    1. Pharyngitis, unspecified etiology  New, uncontrolled problem.  Clinical evaluation consistent with pharyngitis.  Rapid strep test in the office was negative.  Will proceed with a Z-Kenny as below.  We will also recommend Tessalon as needed for cough.  Recommended home remedies, hydration and as needed Tylenol.    - POCT Rapid Strep A  - azithromycin (ZITHROMAX Z-KENNY) 250 MG Tab; Take 1 Tab by mouth every day for 5 days. Take 2 tabs on day 1  Dispense: 6 Tab; Refill: 0  - benzonatate (TESSALON) 100 MG Cap; Take 1 Cap by mouth 3 times a day as needed for Cough.  Dispense: 60 Cap; Refill: 0    2. Other specified hypothyroidism  New to me, chronic controlled.  Recommended continuing on levothyroxine 88 mcg daily.  Have a pending TSH repeat to be done next month.    3. Vitamin D deficiency  New to me, chronic controlled.  She will continue to take over-the-counter supplements with 1000 IUs daily.  And she has a pending repeat vitamin D level to be done next month.      Follow Up:      Return for PRN with PCP.      Please note that this dictation was created using voice recognition software. I have made every reasonable attempt to correct obvious errors, but I expect that there are errors of grammar and possibly content that I did not discover before finalizing the note.    Signed by: Trista Malcolm M.D.

## 2018-12-27 NOTE — ASSESSMENT & PLAN NOTE
New to me, chronic uncontrolled problem.  Her most recent vitamin D level was 8 in September 2018.  Since then, she started taking over-the-counter vitamin D with nature made vitamins 1000 IUs daily.

## 2018-12-27 NOTE — ASSESSMENT & PLAN NOTE
New to discuss, uncontrolled problem.  She reported 5 days history of being sick.  Started with a sore throat and some nasal congestion without drainage or postnasal drip.  Sore throat was very severe that she was not able to eat or drink yesterday.  She does have a dry cough without sputum production.  She denies sinus pain/pressure.  Denies fever/chills.  Eyes/ear problem.   Reported a sick contact with her daughter-in-law had similar symptoms and her  got sick around the same time.  She denies chest pain, shortness of breath, dizziness or lower extremity edema.  She is not a smoker.

## 2018-12-27 NOTE — ASSESSMENT & PLAN NOTE
New to me, chronic controlled.  She has hypothyroidism for many years and currently on levothyroxine 88 mcg daily.  She takes it early in the morning and an empty stomach without other food or beverages.  She seems to be clinically euthyroid.  Her most recent TSH was normal in February 2017.

## 2018-12-27 NOTE — PATIENT INSTRUCTIONS
Pharyngitis  Pharyngitis is redness, pain, and swelling (inflammation) of your pharynx.  What are the causes?  Pharyngitis is usually caused by infection. Most of the time, these infections are from viruses (viral) and are part of a cold. However, sometimes pharyngitis is caused by bacteria (bacterial). Pharyngitis can also be caused by allergies. Viral pharyngitis may be spread from person to person by coughing, sneezing, and personal items or utensils (cups, forks, spoons, toothbrushes). Bacterial pharyngitis may be spread from person to person by more intimate contact, such as kissing.  What are the signs or symptoms?  Symptoms of pharyngitis include:  · Sore throat.  · Tiredness (fatigue).  · Low-grade fever.  · Headache.  · Joint pain and muscle aches.  · Skin rashes.  · Swollen lymph nodes.  · Plaque-like film on throat or tonsils (often seen with bacterial pharyngitis).  How is this diagnosed?  Your health care provider will ask you questions about your illness and your symptoms. Your medical history, along with a physical exam, is often all that is needed to diagnose pharyngitis. Sometimes, a rapid strep test is done. Other lab tests may also be done, depending on the suspected cause.  How is this treated?  Viral pharyngitis will usually get better in 3-4 days without the use of medicine. Bacterial pharyngitis is treated with medicines that kill germs (antibiotics).  Follow these instructions at home:  · Drink enough water and fluids to keep your urine clear or pale yellow.  · Only take over-the-counter or prescription medicines as directed by your health care provider:  ¨ If you are prescribed antibiotics, make sure you finish them even if you start to feel better.  ¨ Do not take aspirin.  · Get lots of rest.  · Gargle with 8 oz of salt water (½ tsp of salt per 1 qt of water) as often as every 1-2 hours to soothe your throat.  · Throat lozenges (if you are not at risk for choking) or sprays may be used to  soothe your throat.  Contact a health care provider if:  · You have large, tender lumps in your neck.  · You have a rash.  · You cough up green, yellow-brown, or bloody spit.  Get help right away if:  · Your neck becomes stiff.  · You drool or are unable to swallow liquids.  · You vomit or are unable to keep medicines or liquids down.  · You have severe pain that does not go away with the use of recommended medicines.  · You have trouble breathing (not caused by a stuffy nose).  This information is not intended to replace advice given to you by your health care provider. Make sure you discuss any questions you have with your health care provider.  Document Released: 12/18/2006 Document Revised: 05/25/2017 Document Reviewed: 08/25/2014  [employer] Interactive Patient Education © 2017 [employer]

## 2019-01-07 RX ORDER — LEVOTHYROXINE SODIUM 88 UG/1
TABLET ORAL
Qty: 90 TAB | Refills: 0 | Status: SHIPPED | OUTPATIENT
Start: 2019-01-07 | End: 2019-03-27

## 2019-03-20 ENCOUNTER — HOSPITAL ENCOUNTER (OUTPATIENT)
Dept: LAB | Facility: MEDICAL CENTER | Age: 74
End: 2019-03-20
Attending: INTERNAL MEDICINE
Payer: MEDICARE

## 2019-03-20 DIAGNOSIS — E78.5 DYSLIPIDEMIA: ICD-10-CM

## 2019-03-20 DIAGNOSIS — E03.8 OTHER SPECIFIED HYPOTHYROIDISM: ICD-10-CM

## 2019-03-20 DIAGNOSIS — E55.9 VITAMIN D DEFICIENCY: ICD-10-CM

## 2019-03-20 LAB
25(OH)D3 SERPL-MCNC: 19 NG/ML (ref 30–100)
ALBUMIN SERPL BCP-MCNC: 4.4 G/DL (ref 3.2–4.9)
ALBUMIN/GLOB SERPL: 1.7 G/DL
ALP SERPL-CCNC: 73 U/L (ref 30–99)
ALT SERPL-CCNC: 96 U/L (ref 2–50)
ANION GAP SERPL CALC-SCNC: 7 MMOL/L (ref 0–11.9)
AST SERPL-CCNC: 58 U/L (ref 12–45)
BASOPHILS # BLD AUTO: 0.7 % (ref 0–1.8)
BASOPHILS # BLD: 0.03 K/UL (ref 0–0.12)
BILIRUB SERPL-MCNC: 0.6 MG/DL (ref 0.1–1.5)
BUN SERPL-MCNC: 19 MG/DL (ref 8–22)
CALCIUM SERPL-MCNC: 9.7 MG/DL (ref 8.5–10.5)
CHLORIDE SERPL-SCNC: 109 MMOL/L (ref 96–112)
CHOLEST SERPL-MCNC: 164 MG/DL (ref 100–199)
CO2 SERPL-SCNC: 23 MMOL/L (ref 20–33)
CREAT SERPL-MCNC: 0.61 MG/DL (ref 0.5–1.4)
EOSINOPHIL # BLD AUTO: 0.28 K/UL (ref 0–0.51)
EOSINOPHIL NFR BLD: 6.6 % (ref 0–6.9)
ERYTHROCYTE [DISTWIDTH] IN BLOOD BY AUTOMATED COUNT: 43.2 FL (ref 35.9–50)
FASTING STATUS PATIENT QL REPORTED: NORMAL
GLOBULIN SER CALC-MCNC: 2.6 G/DL (ref 1.9–3.5)
GLUCOSE SERPL-MCNC: 108 MG/DL (ref 65–99)
HCT VFR BLD AUTO: 41 % (ref 37–47)
HDLC SERPL-MCNC: 46 MG/DL
HGB BLD-MCNC: 13.7 G/DL (ref 12–16)
IMM GRANULOCYTES # BLD AUTO: 0.01 K/UL (ref 0–0.11)
IMM GRANULOCYTES NFR BLD AUTO: 0.2 % (ref 0–0.9)
LDLC SERPL CALC-MCNC: 97 MG/DL
LYMPHOCYTES # BLD AUTO: 1.13 K/UL (ref 1–4.8)
LYMPHOCYTES NFR BLD: 26.5 % (ref 22–41)
MCH RBC QN AUTO: 30 PG (ref 27–33)
MCHC RBC AUTO-ENTMCNC: 33.4 G/DL (ref 33.6–35)
MCV RBC AUTO: 89.9 FL (ref 81.4–97.8)
MONOCYTES # BLD AUTO: 0.55 K/UL (ref 0–0.85)
MONOCYTES NFR BLD AUTO: 12.9 % (ref 0–13.4)
NEUTROPHILS # BLD AUTO: 2.26 K/UL (ref 2–7.15)
NEUTROPHILS NFR BLD: 53.1 % (ref 44–72)
NRBC # BLD AUTO: 0 K/UL
NRBC BLD-RTO: 0 /100 WBC
PLATELET # BLD AUTO: 178 K/UL (ref 164–446)
PMV BLD AUTO: 10.1 FL (ref 9–12.9)
POTASSIUM SERPL-SCNC: 4.1 MMOL/L (ref 3.6–5.5)
PROT SERPL-MCNC: 7 G/DL (ref 6–8.2)
RBC # BLD AUTO: 4.56 M/UL (ref 4.2–5.4)
SODIUM SERPL-SCNC: 139 MMOL/L (ref 135–145)
T4 FREE SERPL-MCNC: 0.9 NG/DL (ref 0.53–1.43)
TRIGL SERPL-MCNC: 103 MG/DL (ref 0–149)
TSH SERPL DL<=0.005 MIU/L-ACNC: 9.83 UIU/ML (ref 0.38–5.33)
WBC # BLD AUTO: 4.3 K/UL (ref 4.8–10.8)

## 2019-03-20 PROCEDURE — 80061 LIPID PANEL: CPT

## 2019-03-20 PROCEDURE — 84443 ASSAY THYROID STIM HORMONE: CPT

## 2019-03-20 PROCEDURE — 36415 COLL VENOUS BLD VENIPUNCTURE: CPT

## 2019-03-20 PROCEDURE — 80053 COMPREHEN METABOLIC PANEL: CPT

## 2019-03-20 PROCEDURE — 84439 ASSAY OF FREE THYROXINE: CPT

## 2019-03-20 PROCEDURE — 85025 COMPLETE CBC W/AUTO DIFF WBC: CPT

## 2019-03-20 PROCEDURE — 82306 VITAMIN D 25 HYDROXY: CPT

## 2019-03-27 ENCOUNTER — HOSPITAL ENCOUNTER (OUTPATIENT)
Dept: LAB | Facility: MEDICAL CENTER | Age: 74
End: 2019-03-27
Attending: INTERNAL MEDICINE
Payer: MEDICARE

## 2019-03-27 ENCOUNTER — OFFICE VISIT (OUTPATIENT)
Dept: MEDICAL GROUP | Facility: LAB | Age: 74
End: 2019-03-27
Payer: MEDICARE

## 2019-03-27 VITALS
OXYGEN SATURATION: 98 % | HEART RATE: 71 BPM | SYSTOLIC BLOOD PRESSURE: 132 MMHG | RESPIRATION RATE: 12 BRPM | WEIGHT: 192 LBS | DIASTOLIC BLOOD PRESSURE: 76 MMHG | TEMPERATURE: 97.5 F | HEIGHT: 63 IN | BODY MASS INDEX: 34.02 KG/M2

## 2019-03-27 DIAGNOSIS — E03.9 HYPOTHYROIDISM, UNSPECIFIED TYPE: ICD-10-CM

## 2019-03-27 DIAGNOSIS — K75.81 NASH (NONALCOHOLIC STEATOHEPATITIS): ICD-10-CM

## 2019-03-27 DIAGNOSIS — E78.5 DYSLIPIDEMIA: ICD-10-CM

## 2019-03-27 DIAGNOSIS — Z91.89 OTHER SPECIFIED PERSONAL RISK FACTORS, NOT ELSEWHERE CLASSIFIED: ICD-10-CM

## 2019-03-27 DIAGNOSIS — E55.9 VITAMIN D DEFICIENCY: ICD-10-CM

## 2019-03-27 LAB
HAV IGM SERPL QL IA: NEGATIVE
HBV CORE IGM SER QL: NEGATIVE
HBV SURFACE AG SER QL: NEGATIVE
HCV AB SER QL: NEGATIVE

## 2019-03-27 PROCEDURE — 80074 ACUTE HEPATITIS PANEL: CPT

## 2019-03-27 PROCEDURE — 99214 OFFICE O/P EST MOD 30 MIN: CPT | Performed by: INTERNAL MEDICINE

## 2019-03-27 PROCEDURE — 36415 COLL VENOUS BLD VENIPUNCTURE: CPT

## 2019-03-27 RX ORDER — LEVOTHYROXINE SODIUM 0.1 MG/1
100 TABLET ORAL
Qty: 90 TAB | Refills: 3 | Status: SHIPPED | OUTPATIENT
Start: 2019-03-27 | End: 2020-03-11

## 2019-03-27 ASSESSMENT — PATIENT HEALTH QUESTIONNAIRE - PHQ9: CLINICAL INTERPRETATION OF PHQ2 SCORE: 0

## 2019-03-27 NOTE — PROGRESS NOTES
CC: Shikha Swift is a 73 y.o. female is suffering from   Chief Complaint   Patient presents with   • Follow-Up     6 months         SUBJECTIVE:  1. Hypothyroidism, unspecified type  Shikha is here for follow-up has a history of hypothyroidism we will increase her Synthroid upwards from 88 200 mcg    2. Vitamin D deficiency  Patient with vitamin D deficiency is to continue on 2000 international units each day    3. Other specified personal risk factors, not elsewhere classified  Encouraged patient undergo CT cardiac scoring because of her history of dyslipidemia    4. Dyslipidemia  Stable continue Lipitor    5. BUCHANAN (nonalcoholic steatohepatitis)  Patient with elevated liver function tests etiology uncertain we will check hepatitis panel ultrasound liver        Past social, family, history: , Salazar  Social History   Substance Use Topics   • Smoking status: Never Smoker   • Smokeless tobacco: Never Used   • Alcohol use 0.0 oz/week      Comment: 1-2 per month         MEDICATIONS:    Current Outpatient Prescriptions:   •  levothyroxine (SYNTHROID) 100 MCG Tab, Take 1 Tab by mouth Every morning on an empty stomach., Disp: 90 Tab, Rfl: 3  •  amLODIPine (NORVASC) 5 MG Tab, TAKE 1 TABLET BY MOUTH EVERY DAY, Disp: 90 Tab, Rfl: 3  •  atorvastatin (LIPITOR) 10 MG Tab, Take 1 Tab by mouth every day., Disp: 90 Tab, Rfl: 3  •  benazepril (LOTENSIN) 40 MG tablet, Take 1 Tab by mouth every day., Disp: 90 Tab, Rfl: 3  •  Probiotic Product (ACIDOPHILUS PROBIOTIC BLEND PO), Take 15 mL by mouth every day., Disp: , Rfl:   •  estradiol (ESTRACE VAGINAL) 0.1 MG/GM vaginal cream, Insert 1 g in vagina Every Sunday and Wednesday., Disp: , Rfl:   •  PROGESTERONE, Apply 1 Application to affected area(s) every evening. Compounded cream [facility], Disp: , Rfl:   •  benzonatate (TESSALON) 100 MG Cap, Take 1 Cap by mouth 3 times a day as needed for Cough., Disp: 60 Cap, Rfl: 0  •  NON SPECIFIED, You are low on  "Vitamin D please start 2,000 IU over the counter, [employer] is a good brand! Regards, Presley Black, , Disp: 1 Each, Rfl: ea  •  Ibuprofen (ADVIL) 200 MG CAPS, Take 1-2 Caps by mouth 1 time daily as needed., Disp: , Rfl:     PROBLEMS:  Patient Active Problem List    Diagnosis Date Noted   • Pharyngitis 12/27/2018   • Vitamin D deficiency 09/13/2018   • Status post total hip replacement, left 02/16/2016   • Atypical face pain    • Essential hypertension 08/11/2015   • Obesity, Class II, BMI 35-39.9, isolated 06/15/2015   • Headache 03/01/2012   • Dyslipidemia 05/22/2009   • Impaired glucose tolerance 05/22/2009   • Hypothyroid 05/22/2009       REVIEW OF SYSTEMS:  Gen.:  No Nausea, Vomiting, fever, Chills.  Heart: No chest pain.  Lungs:  No shortness of Breath.  Psychological: Mack unusual Anxiety depression     PHYSICAL EXAM   Constitutional: Alert, cooperative, not in acute distress.  Cardiovascular:  Rate Rhythm is regular without murmurs rubs clicks.     Thorax & Lungs: Clear to auscultation, no wheezing, rhonchi, or rales  HENT: Normocephalic, Atraumatic.  Eyes: PERRLA, EOMI, Conjunctiva normal.   Neck: Trachia is midline no swelling of the thyroid.   Lymphatic: No lymphadenopathy noted.   Neurologic: Alert & oriented x 3, cranial nerves II through XII are intact, Normal motor function, Normal sensory function, No focal deficits noted.   Psychiatric: Affect normal, Judgment normal, Mood normal.     VITAL SIGNS:/76   Pulse 71   Temp 36.4 °C (97.5 °F) (Temporal)   Resp 12   Ht 1.6 m (5' 3\")   Wt 87.1 kg (192 lb)   SpO2 98%   BMI 34.01 kg/m²     Labs: Reviewed    Assessment:                                                     Plan:    1. Hypothyroidism, unspecified type  Increase Synthroid from  mcg  - levothyroxine (SYNTHROID) 100 MCG Tab; Take 1 Tab by mouth Every morning on an empty stomach.  Dispense: 90 Tab; Refill: 3  - FREE THYROXINE; Future  - TSH; Future    2. Vitamin D " deficiency  Continue vitamin D at 2000 international units  - VITAMIN D,25 HYDROXY; Future    3. Other specified personal risk factors, not elsewhere classified  CT cardiac scoring ordered  - CT-CARDIAC SCORING; Future    4. Dyslipidemia  CT cardiac scoring ordered  - CT-CARDIAC SCORING; Future    5. BUCHANAN (nonalcoholic steatohepatitis)  Hepatitis panel ultrasound liver all ordered  - HEPATITIS PANEL ACUTE(4 COMPONENTS); Future  - US-RUQ; Future

## 2019-04-28 ENCOUNTER — HOSPITAL ENCOUNTER (OUTPATIENT)
Dept: RADIOLOGY | Facility: MEDICAL CENTER | Age: 74
End: 2019-04-28
Attending: INTERNAL MEDICINE
Payer: MEDICARE

## 2019-04-28 ENCOUNTER — HOSPITAL ENCOUNTER (OUTPATIENT)
Dept: RADIOLOGY | Facility: MEDICAL CENTER | Age: 74
End: 2019-04-28
Attending: INTERNAL MEDICINE

## 2019-04-28 DIAGNOSIS — Z91.89 OTHER SPECIFIED PERSONAL RISK FACTORS, NOT ELSEWHERE CLASSIFIED: ICD-10-CM

## 2019-04-28 DIAGNOSIS — E78.5 DYSLIPIDEMIA: ICD-10-CM

## 2019-04-28 DIAGNOSIS — K75.81 NASH (NONALCOHOLIC STEATOHEPATITIS): ICD-10-CM

## 2019-04-28 PROCEDURE — 4410556 CT-CARDIAC SCORING

## 2019-04-28 PROCEDURE — 76705 ECHO EXAM OF ABDOMEN: CPT

## 2019-04-30 ENCOUNTER — OFFICE VISIT (OUTPATIENT)
Dept: MEDICAL GROUP | Facility: LAB | Age: 74
End: 2019-04-30
Payer: MEDICARE

## 2019-04-30 VITALS
TEMPERATURE: 97.9 F | HEART RATE: 91 BPM | DIASTOLIC BLOOD PRESSURE: 80 MMHG | OXYGEN SATURATION: 96 % | WEIGHT: 192 LBS | BODY MASS INDEX: 34.02 KG/M2 | RESPIRATION RATE: 12 BRPM | HEIGHT: 63 IN | SYSTOLIC BLOOD PRESSURE: 150 MMHG

## 2019-04-30 DIAGNOSIS — R79.89 ABNORMAL LFTS: ICD-10-CM

## 2019-04-30 DIAGNOSIS — E66.9 OBESITY, CLASS II, BMI 35-39.9, ISOLATED: ICD-10-CM

## 2019-04-30 DIAGNOSIS — E78.5 DYSLIPIDEMIA: ICD-10-CM

## 2019-04-30 PROCEDURE — 99214 OFFICE O/P EST MOD 30 MIN: CPT | Performed by: INTERNAL MEDICINE

## 2019-04-30 NOTE — PROGRESS NOTES
CC: Shikha Swift is a 74 y.o. female is suffering from   Chief Complaint   Patient presents with   • Results     discuss CT Cardiac Scoring results         SUBJECTIVE:  1. Dyslipidemia  Lauryn is here for follow-up has a history of dyslipidemia discussed her need to continue on statin drug therapy    2. Abnormal LFTs  Patient will modestly elevated liver function tests no evidence of hepatitis we will recheck labs in 2 months    3. Obesity, Class II, BMI 35-39.9, isolated  Ongoing        Past social, family, history: , Ravinder  Social History   Substance Use Topics   • Smoking status: Never Smoker   • Smokeless tobacco: Never Used   • Alcohol use 0.0 oz/week      Comment: 1-2 per month         MEDICATIONS:    Current Outpatient Prescriptions:   •  benazepril (LOTENSIN) 40 MG tablet, TAKE 1 TABLET BY MOUTH EVERY DAY, Disp: 90 Tab, Rfl: 3  •  levothyroxine (SYNTHROID) 100 MCG Tab, Take 1 Tab by mouth Every morning on an empty stomach., Disp: 90 Tab, Rfl: 3  •  amLODIPine (NORVASC) 5 MG Tab, TAKE 1 TABLET BY MOUTH EVERY DAY, Disp: 90 Tab, Rfl: 3  •  atorvastatin (LIPITOR) 10 MG Tab, Take 1 Tab by mouth every day., Disp: 90 Tab, Rfl: 3  •  Probiotic Product (ACIDOPHILUS PROBIOTIC BLEND PO), Take 15 mL by mouth every day., Disp: , Rfl:   •  estradiol (ESTRACE VAGINAL) 0.1 MG/GM vaginal cream, Insert 1 g in vagina Every Sunday and Wednesday., Disp: , Rfl:   •  PROGESTERONE, Apply 1 Application to affected area(s) every evening. Compounded cream [facility], Disp: , Rfl:   •  benzonatate (TESSALON) 100 MG Cap, Take 1 Cap by mouth 3 times a day as needed for Cough., Disp: 60 Cap, Rfl: 0  •  NON SPECIFIED, You are low on Vitamin D please start 2,000 IU over the counter, [employer] is a good brand! Regards, Presley Black DO, Disp: 1 Each, Rfl: ea  •  Ibuprofen (ADVIL) 200 MG CAPS, Take 1-2 Caps by mouth 1 time daily as needed., Disp: , Rfl:     PROBLEMS:  Patient Active Problem List    Diagnosis Date  "Noted   • Pharyngitis 12/27/2018   • Vitamin D deficiency 09/13/2018   • Status post total hip replacement, left 02/16/2016   • Atypical face pain    • Essential hypertension 08/11/2015   • Obesity, Class II, BMI 35-39.9, isolated 06/15/2015   • Headache 03/01/2012   • Dyslipidemia 05/22/2009   • Impaired glucose tolerance 05/22/2009   • Hypothyroid 05/22/2009       REVIEW OF SYSTEMS:  Gen.:  No Nausea, Vomiting, fever, Chills.  Heart: No chest pain.  Lungs:  No shortness of Breath.  Psychological: Mack unusual Anxiety depression     PHYSICAL EXAM   Constitutional: Alert, cooperative, not in acute distress.  Cardiovascular:  Rate Rhythm is regular without murmurs rubs clicks.     Thorax & Lungs: Clear to auscultation, no wheezing, rhonchi, or rales  HENT: Normocephalic, Atraumatic.  Eyes: PERRLA, EOMI, Conjunctiva normal.   Neck: Trachia is midline no swelling of the thyroid.   Neurologic: Alert & oriented x 3, cranial nerves II through XII are intact, Normal motor function, Normal sensory function, No focal deficits noted.   Psychiatric: Affect normal, Judgment normal, Mood normal.     VITAL SIGNS:/80   Pulse 91   Temp 36.6 °C (97.9 °F)   Resp 12   Ht 1.6 m (5' 3\")   Wt 87.1 kg (192 lb)   SpO2 96%   BMI 34.01 kg/m²     Labs: Reviewed    Assessment:                                                     Plan:    1. Dyslipidemia  Lipid profile comprehensive metabolic panel in 2 months  - Lipid Profile; Future  - Comp Metabolic Panel; Future    2. Abnormal LFTs  Recheck labs, negative for hepatitis    3. Obesity, Class II, BMI 35-39.9, isolated  Possibly patient is dealing with nonalcoholic steatohepatitis, discussed with the patient the importance of trying to workout on a regular basis.        "

## 2019-06-21 ENCOUNTER — HOSPITAL ENCOUNTER (OUTPATIENT)
Dept: LAB | Facility: MEDICAL CENTER | Age: 74
End: 2019-06-21
Attending: INTERNAL MEDICINE
Payer: MEDICARE

## 2019-06-21 DIAGNOSIS — E03.9 HYPOTHYROIDISM, UNSPECIFIED TYPE: ICD-10-CM

## 2019-06-21 DIAGNOSIS — E78.5 DYSLIPIDEMIA: ICD-10-CM

## 2019-06-21 DIAGNOSIS — E55.9 VITAMIN D DEFICIENCY: ICD-10-CM

## 2019-06-21 LAB
25(OH)D3 SERPL-MCNC: 14 NG/ML (ref 30–100)
ALBUMIN SERPL BCP-MCNC: 4.4 G/DL (ref 3.2–4.9)
ALBUMIN/GLOB SERPL: 1.8 G/DL
ALP SERPL-CCNC: 69 U/L (ref 30–99)
ALT SERPL-CCNC: 67 U/L (ref 2–50)
ANION GAP SERPL CALC-SCNC: 7 MMOL/L (ref 0–11.9)
AST SERPL-CCNC: 43 U/L (ref 12–45)
BILIRUB SERPL-MCNC: 0.7 MG/DL (ref 0.1–1.5)
BUN SERPL-MCNC: 17 MG/DL (ref 8–22)
CALCIUM SERPL-MCNC: 9.9 MG/DL (ref 8.5–10.5)
CHLORIDE SERPL-SCNC: 110 MMOL/L (ref 96–112)
CHOLEST SERPL-MCNC: 149 MG/DL (ref 100–199)
CO2 SERPL-SCNC: 22 MMOL/L (ref 20–33)
CREAT SERPL-MCNC: 0.62 MG/DL (ref 0.5–1.4)
FASTING STATUS PATIENT QL REPORTED: NORMAL
GLOBULIN SER CALC-MCNC: 2.5 G/DL (ref 1.9–3.5)
GLUCOSE SERPL-MCNC: 107 MG/DL (ref 65–99)
HDLC SERPL-MCNC: 40 MG/DL
LDLC SERPL CALC-MCNC: 87 MG/DL
POTASSIUM SERPL-SCNC: 3.9 MMOL/L (ref 3.6–5.5)
PROT SERPL-MCNC: 6.9 G/DL (ref 6–8.2)
SODIUM SERPL-SCNC: 139 MMOL/L (ref 135–145)
T4 FREE SERPL-MCNC: 1.09 NG/DL (ref 0.53–1.43)
TRIGL SERPL-MCNC: 109 MG/DL (ref 0–149)
TSH SERPL DL<=0.005 MIU/L-ACNC: 1.57 UIU/ML (ref 0.38–5.33)

## 2019-06-21 PROCEDURE — 84439 ASSAY OF FREE THYROXINE: CPT

## 2019-06-21 PROCEDURE — 80061 LIPID PANEL: CPT

## 2019-06-21 PROCEDURE — 36415 COLL VENOUS BLD VENIPUNCTURE: CPT

## 2019-06-21 PROCEDURE — 82306 VITAMIN D 25 HYDROXY: CPT

## 2019-06-21 PROCEDURE — 80053 COMPREHEN METABOLIC PANEL: CPT

## 2019-06-21 PROCEDURE — 84443 ASSAY THYROID STIM HORMONE: CPT

## 2019-06-23 ENCOUNTER — TELEPHONE (OUTPATIENT)
Dept: MEDICAL GROUP | Facility: LAB | Age: 74
End: 2019-06-23

## 2019-06-23 NOTE — TELEPHONE ENCOUNTER
Phone Number Called: [phone] (home)     Call outcome: left message for patient to call back regarding message below    Message: I left a voicemail to please call us back regarding her lab results form dos 6/21/19. Per Dr. Black requested the results/letter to be mailed out to the patient.

## 2019-06-27 ENCOUNTER — OFFICE VISIT (OUTPATIENT)
Dept: MEDICAL GROUP | Facility: LAB | Age: 74
End: 2019-06-27
Payer: MEDICARE

## 2019-06-27 VITALS
BODY MASS INDEX: 34.38 KG/M2 | SYSTOLIC BLOOD PRESSURE: 165 MMHG | RESPIRATION RATE: 12 BRPM | WEIGHT: 194 LBS | TEMPERATURE: 98.5 F | DIASTOLIC BLOOD PRESSURE: 70 MMHG | HEART RATE: 68 BPM | OXYGEN SATURATION: 98 % | HEIGHT: 63 IN

## 2019-06-27 DIAGNOSIS — E78.5 DYSLIPIDEMIA: ICD-10-CM

## 2019-06-27 DIAGNOSIS — I10 ESSENTIAL HYPERTENSION: ICD-10-CM

## 2019-06-27 DIAGNOSIS — E03.8 OTHER SPECIFIED HYPOTHYROIDISM: ICD-10-CM

## 2019-06-27 DIAGNOSIS — E55.9 VITAMIN D DEFICIENCY: ICD-10-CM

## 2019-06-27 PROCEDURE — 99214 OFFICE O/P EST MOD 30 MIN: CPT | Performed by: INTERNAL MEDICINE

## 2019-06-27 RX ORDER — TRIAMTERENE AND HYDROCHLOROTHIAZIDE 37.5; 25 MG/1; MG/1
1 TABLET ORAL DAILY
Qty: 30 TAB | Refills: 3 | Status: SHIPPED | OUTPATIENT
Start: 2019-06-27 | End: 2019-06-27 | Stop reason: SDUPTHER

## 2019-06-27 RX ORDER — TRIAMTERENE AND HYDROCHLOROTHIAZIDE 37.5; 25 MG/1; MG/1
TABLET ORAL
Qty: 90 TAB | Refills: 3 | Status: SHIPPED | OUTPATIENT
Start: 2019-06-27 | End: 2022-05-11

## 2019-06-27 RX ORDER — TRIAMTERENE AND HYDROCHLOROTHIAZIDE 37.5; 25 MG/1; MG/1
1 TABLET ORAL DAILY
Qty: 30 TAB | Refills: 3 | Status: SHIPPED | OUTPATIENT
Start: 2019-06-27 | End: 2022-05-11

## 2019-06-27 NOTE — PROGRESS NOTES
CC: Shikha Swift is a 74 y.o. female is suffering from   Chief Complaint   Patient presents with   • Hyperlipidemia     2 months follow up         SUBJECTIVE:  1. Dyslipidemia  Shikha is here for follow-up, continues to have good cholesterol results.  Labs were reviewed    2. Other specified hypothyroidism  Patient with adequate thyroid replacement    3. Vitamin D deficiency  Low vitamin D encouraged patient be on 2000 international units daily    4. Essential hypertension  Hypertension we will add Maxide to her medical regimen        Past social, family, history: , Salazar  Social History   Substance Use Topics   • Smoking status: Never Smoker   • Smokeless tobacco: Never Used   • Alcohol use 0.0 oz/week      Comment: 1-2 per month         MEDICATIONS:    Current Outpatient Prescriptions:   •  NON SPECIFIED, You are low on Vitamin D please start 2,000 IU over the counter, [employer] is a good brand!, Disp: 60 Each, Rfl: 0  •  benazepril (LOTENSIN) 40 MG tablet, TAKE 1 TABLET BY MOUTH EVERY DAY, Disp: 90 Tab, Rfl: 3  •  levothyroxine (SYNTHROID) 100 MCG Tab, Take 1 Tab by mouth Every morning on an empty stomach., Disp: 90 Tab, Rfl: 3  •  amLODIPine (NORVASC) 5 MG Tab, TAKE 1 TABLET BY MOUTH EVERY DAY, Disp: 90 Tab, Rfl: 3  •  atorvastatin (LIPITOR) 10 MG Tab, Take 1 Tab by mouth every day., Disp: 90 Tab, Rfl: 3  •  Probiotic Product (ACIDOPHILUS PROBIOTIC BLEND PO), Take 15 mL by mouth every day., Disp: , Rfl:   •  estradiol (ESTRACE VAGINAL) 0.1 MG/GM vaginal cream, Insert 1 g in vagina Every Sunday and Wednesday., Disp: , Rfl:   •  PROGESTERONE, Apply 1 Application to affected area(s) every evening. Compounded cream [facility], Disp: , Rfl:   •  triamterene-hctz (MAXZIDE-25/DYAZIDE) 37.5-25 MG Tab, TAKE 1 TABLET BY MOUTH EVERY DAY, Disp: 90 Tab, Rfl: 3  •  benzonatate (TESSALON) 100 MG Cap, Take 1 Cap by mouth 3 times a day as needed for Cough., Disp: 60 Cap, Rfl: 0  •  NON SPECIFIED,  "You are low on Vitamin D please start 2,000 IU over the counter, [employer] is a good brand! Regards, Presley Black, , Disp: 1 Each, Rfl: ea  •  Ibuprofen (ADVIL) 200 MG CAPS, Take 1-2 Caps by mouth 1 time daily as needed., Disp: , Rfl:     PROBLEMS:  Patient Active Problem List    Diagnosis Date Noted   • Pharyngitis 12/27/2018   • Vitamin D deficiency 09/13/2018   • Status post total hip replacement, left 02/16/2016   • Atypical face pain    • Essential hypertension 08/11/2015   • Obesity, Class II, BMI 35-39.9, isolated 06/15/2015   • Headache 03/01/2012   • Dyslipidemia 05/22/2009   • Impaired glucose tolerance 05/22/2009   • Hypothyroid 05/22/2009       REVIEW OF SYSTEMS:  Gen.:  No Nausea, Vomiting, fever, Chills.  Heart: No chest pain.  Lungs:  No shortness of Breath.  Psychological: Mack unusual Anxiety depression     PHYSICAL EXAM   Constitutional: Alert, cooperative, not in acute distress.  Cardiovascular:  Rate Rhythm is regular without murmurs rubs clicks.     Thorax & Lungs: Clear to auscultation, no wheezing, rhonchi, or rales  HENT: Normocephalic, Atraumatic.  Eyes: PERRLA, EOMI, Conjunctiva normal.   Neck: Trachia is midline no swelling of the thyroid.   Lymphatic: No lymphadenopathy noted.   Neurologic: Alert & oriented x 3, cranial nerves II through XII are intact, Normal motor function, Normal sensory function, No focal deficits noted.   Psychiatric: Affect normal, Judgment normal, Mood normal.     VITAL SIGNS:BP (!) 165/70   Pulse 68   Temp 36.9 °C (98.5 °F) (Temporal)   Resp 12   Ht 1.6 m (5' 3\")   Wt 88 kg (194 lb)   SpO2 98%   BMI 34.37 kg/m²     Labs: Reviewed    Assessment:                                                     Plan:    1. Dyslipidemia  Stable    2. Other specified hypothyroidism  No change in medical therapy    3. Vitamin D deficiency  Recheck vitamin D  - NON SPECIFIED; You are low on Vitamin D please start 2,000 IU over the counter, [employer] is a good brand! "  Dispense: 60 Each; Refill: 0    4. Essential hypertension  Start Maxide

## 2019-07-08 ENCOUNTER — TELEPHONE (OUTPATIENT)
Dept: MEDICAL GROUP | Facility: LAB | Age: 74
End: 2019-07-08

## 2019-07-08 NOTE — TELEPHONE ENCOUNTER
"1. Caller Name: Lauryn                                         Call Back Number: [phone] (home)        Patient approves a detailed voicemail message: yes    You had added Maxide to pt's BP med regimen.  This made her dizzy and nauseated.  She stopped and feels much better now.  She picked up a \"natural water pill\" from [employer] that she is now taking.    "

## 2019-07-12 RX ORDER — ATORVASTATIN CALCIUM 10 MG/1
TABLET, FILM COATED ORAL
Qty: 90 TAB | Refills: 3 | Status: SHIPPED | OUTPATIENT
Start: 2019-07-12 | End: 2020-07-10

## 2019-07-12 NOTE — TELEPHONE ENCOUNTER
Was the patient seen in the last year in this department? Yes   6/27/19  Does patient have an active prescription for medications requested? No     Received Request Via: Pharmacy

## 2019-07-30 ENCOUNTER — OFFICE VISIT (OUTPATIENT)
Dept: MEDICAL GROUP | Facility: LAB | Age: 74
End: 2019-07-30
Payer: MEDICARE

## 2019-07-30 VITALS
OXYGEN SATURATION: 97 % | HEART RATE: 69 BPM | RESPIRATION RATE: 14 BRPM | SYSTOLIC BLOOD PRESSURE: 130 MMHG | HEIGHT: 63 IN | DIASTOLIC BLOOD PRESSURE: 80 MMHG | TEMPERATURE: 98.6 F | BODY MASS INDEX: 33.49 KG/M2 | WEIGHT: 189 LBS

## 2019-07-30 DIAGNOSIS — E78.5 DYSLIPIDEMIA: ICD-10-CM

## 2019-07-30 DIAGNOSIS — E03.8 OTHER SPECIFIED HYPOTHYROIDISM: ICD-10-CM

## 2019-07-30 PROCEDURE — 99213 OFFICE O/P EST LOW 20 MIN: CPT | Performed by: INTERNAL MEDICINE

## 2019-07-30 NOTE — PROGRESS NOTES
CC: Shikha Swift is a 74 y.o. female is suffering from   Chief Complaint   Patient presents with   • Hypertension     1 month follow up         SUBJECTIVE:  1. Dyslipidemia  Patient with a history of dyslipidemia no change in medical therapy recheck cholesterol 6 months    2. Other specified hypothyroidism  History of hypothyroidism recheck free T4 TSH        Past social, family, history:   Social History   Substance Use Topics   • Smoking status: Never Smoker   • Smokeless tobacco: Never Used   • Alcohol use 0.0 oz/week      Comment: 1-2 per month         MEDICATIONS:    Current Outpatient Prescriptions:   •  atorvastatin (LIPITOR) 10 MG Tab, TAKE 1 TABLET BY MOUTH EVERY DAY, Disp: 90 Tab, Rfl: 3  •  benazepril (LOTENSIN) 40 MG tablet, TAKE 1 TABLET BY MOUTH EVERY DAY, Disp: 90 Tab, Rfl: 3  •  levothyroxine (SYNTHROID) 100 MCG Tab, Take 1 Tab by mouth Every morning on an empty stomach., Disp: 90 Tab, Rfl: 3  •  amLODIPine (NORVASC) 5 MG Tab, TAKE 1 TABLET BY MOUTH EVERY DAY, Disp: 90 Tab, Rfl: 3  •  Probiotic Product (ACIDOPHILUS PROBIOTIC BLEND PO), Take 15 mL by mouth every day., Disp: , Rfl:   •  estradiol (ESTRACE VAGINAL) 0.1 MG/GM vaginal cream, Insert 1 g in vagina Every Sunday and Wednesday., Disp: , Rfl:   •  PROGESTERONE, Apply 1 Application to affected area(s) every evening. Compounded cream [facility], Disp: , Rfl:   •  NON SPECIFIED, You are low on Vitamin D please start 2,000 IU over the counter, [employer] is a good brand!, Disp: 60 Each, Rfl: 0  •  triamterene-hctz (MAXZIDE-25/DYAZIDE) 37.5-25 MG Tab, TAKE 1 TABLET BY MOUTH EVERY DAY (Patient not taking: Reported on 7/30/2019), Disp: 90 Tab, Rfl: 3  •  triamterene-hctz (MAXZIDE-25/DYAZIDE) 37.5-25 MG Tab, Take 1 Tab by mouth every day. (Patient not taking: Reported on 7/30/2019), Disp: 30 Tab, Rfl: 3  •  benzonatate (TESSALON) 100 MG Cap, Take 1 Cap by mouth 3 times a day as needed for Cough., Disp: 60 Cap, Rfl: 0  •  NON  "SPECIFIED, You are low on Vitamin D please start 2,000 IU over the counter, [employer] is a good brand! Regards, Presley Black, , Disp: 1 Each, Rfl: ea  •  Ibuprofen (ADVIL) 200 MG CAPS, Take 1-2 Caps by mouth 1 time daily as needed., Disp: , Rfl:     PROBLEMS:  Patient Active Problem List    Diagnosis Date Noted   • Pharyngitis 12/27/2018   • Vitamin D deficiency 09/13/2018   • Status post total hip replacement, left 02/16/2016   • Atypical face pain    • Essential hypertension 08/11/2015   • Obesity, Class II, BMI 35-39.9, isolated 06/15/2015   • Headache 03/01/2012   • Dyslipidemia 05/22/2009   • Impaired glucose tolerance 05/22/2009   • Hypothyroid 05/22/2009       REVIEW OF SYSTEMS:  Gen.:  No Nausea, Vomiting, fever, Chills.  Heart: No chest pain.  Lungs:  No shortness of Breath.  Psychological: Mack unusual Anxiety depression     PHYSICAL EXAM   Constitutional: Alert, cooperative, not in acute distress.  Cardiovascular:  Rate Rhythm is regular without murmurs rubs clicks.     Thorax & Lungs: Clear to auscultation, no wheezing, rhonchi, or rales  HENT: Normocephalic, Atraumatic.  Eyes: PERRLA, EOMI, Conjunctiva normal.   Neck: Trachia is midline no swelling of the thyroid.   Lymphatic: No lymphadenopathy noted.   Neurologic: Alert & oriented x 3, cranial nerves II through XII are intact, Normal motor function, Normal sensory function, No focal deficits noted.   Psychiatric: Affect normal, Judgment normal, Mood normal.     VITAL SIGNS:/80   Pulse 69   Temp 37 °C (98.6 °F) (Temporal)   Resp 14   Ht 1.6 m (5' 3\")   Wt 85.7 kg (189 lb)   SpO2 97%   BMI 33.48 kg/m²     Labs: Reviewed    Assessment:                                                     Plan:    1. Dyslipidemia  Clinically stable no change in medical therapy  - Patient identified as having weight management issue.  Appropriate orders and counseling given.    2. Other specified hypothyroidism  Recheck thyroid in 6 months  - Patient " identified as having weight management issue.  Appropriate orders and counseling given.  - TSH; Future  - FREE THYROXINE; Future  - VITAMIN D,25 HYDROXY; Future

## 2019-08-05 RX ORDER — AMLODIPINE BESYLATE 5 MG/1
TABLET ORAL
Qty: 90 TAB | Refills: 1 | Status: SHIPPED | OUTPATIENT
Start: 2019-08-05 | End: 2020-01-28

## 2019-11-13 ENCOUNTER — HOSPITAL ENCOUNTER (OUTPATIENT)
Dept: LAB | Facility: MEDICAL CENTER | Age: 74
End: 2019-11-13
Attending: INTERNAL MEDICINE
Payer: MEDICARE

## 2019-11-13 DIAGNOSIS — E03.8 OTHER SPECIFIED HYPOTHYROIDISM: ICD-10-CM

## 2019-11-13 LAB
25(OH)D3 SERPL-MCNC: 29 NG/ML (ref 30–100)
T4 FREE SERPL-MCNC: 1.06 NG/DL (ref 0.53–1.43)
TSH SERPL DL<=0.005 MIU/L-ACNC: 1.6 UIU/ML (ref 0.38–5.33)

## 2019-11-13 PROCEDURE — 82306 VITAMIN D 25 HYDROXY: CPT | Mod: GA

## 2019-11-13 PROCEDURE — 36415 COLL VENOUS BLD VENIPUNCTURE: CPT | Mod: GA

## 2019-11-13 PROCEDURE — 84439 ASSAY OF FREE THYROXINE: CPT

## 2019-11-13 PROCEDURE — 84443 ASSAY THYROID STIM HORMONE: CPT

## 2019-11-20 ENCOUNTER — OFFICE VISIT (OUTPATIENT)
Dept: MEDICAL GROUP | Facility: LAB | Age: 74
End: 2019-11-20
Payer: MEDICARE

## 2019-11-20 VITALS
DIASTOLIC BLOOD PRESSURE: 75 MMHG | RESPIRATION RATE: 12 BRPM | HEIGHT: 63 IN | WEIGHT: 190.6 LBS | TEMPERATURE: 98.5 F | HEART RATE: 68 BPM | BODY MASS INDEX: 33.77 KG/M2 | SYSTOLIC BLOOD PRESSURE: 142 MMHG | OXYGEN SATURATION: 97 %

## 2019-11-20 DIAGNOSIS — E55.9 VITAMIN D DEFICIENCY: ICD-10-CM

## 2019-11-20 DIAGNOSIS — R73.02 IGT (IMPAIRED GLUCOSE TOLERANCE): ICD-10-CM

## 2019-11-20 DIAGNOSIS — E66.9 OBESITY, CLASS II, BMI 35-39.9, ISOLATED: ICD-10-CM

## 2019-11-20 DIAGNOSIS — E03.9 HYPOTHYROIDISM, UNSPECIFIED TYPE: ICD-10-CM

## 2019-11-20 PROCEDURE — 99214 OFFICE O/P EST MOD 30 MIN: CPT | Performed by: INTERNAL MEDICINE

## 2019-11-21 NOTE — PROGRESS NOTES
CC: Shikha Swift is a 74 y.o. female is suffering from   Chief Complaint   Patient presents with   • Thyroid Follow-up     4 months follow up         SUBJECTIVE:  1. Vitamin D deficiency  Lauryn is here for follow-up has a history of vitamin D deficiency is doing well is now therapeutic    2. Obesity, Class II, BMI 35-39.9, isolated  History of obesity no change in medical therapy    3. IGT (impaired glucose tolerance)  Impaired glucose tolerance recheck labs    4. Hypothyroidism, unspecified type  Patient with a history of hypothyroidism is now stable      Past social, family, history:   Social History     Tobacco Use   • Smoking status: Never Smoker   • Smokeless tobacco: Never Used   Substance Use Topics   • Alcohol use: Yes     Alcohol/week: 0.0 oz     Comment: 1-2 per month   • Drug use: No         MEDICATIONS:    Current Outpatient Medications:   •  amLODIPine (NORVASC) 5 MG Tab, TAKE 1 TABLET BY MOUTH EVERY DAY, Disp: 90 Tab, Rfl: 1  •  atorvastatin (LIPITOR) 10 MG Tab, TAKE 1 TABLET BY MOUTH EVERY DAY, Disp: 90 Tab, Rfl: 3  •  benazepril (LOTENSIN) 40 MG tablet, TAKE 1 TABLET BY MOUTH EVERY DAY, Disp: 90 Tab, Rfl: 3  •  levothyroxine (SYNTHROID) 100 MCG Tab, Take 1 Tab by mouth Every morning on an empty stomach., Disp: 90 Tab, Rfl: 3  •  NON SPECIFIED, You are low on Vitamin D please start 2,000 IU over the counter, [employer] is a good brand! Regards, Presley Black DO, Disp: 1 Each, Rfl: ea  •  Probiotic Product (ACIDOPHILUS PROBIOTIC BLEND PO), Take 15 mL by mouth every day., Disp: , Rfl:   •  estradiol (ESTRACE VAGINAL) 0.1 MG/GM vaginal cream, Insert 1 g in vagina Every Sunday and Wednesday., Disp: , Rfl:   •  Ibuprofen (ADVIL) 200 MG CAPS, Take 1-2 Caps by mouth 1 time daily as needed., Disp: , Rfl:   •  PROGESTERONE, Apply 1 Application to affected area(s) every evening. Compounded cream [facility], Disp: , Rfl:   •  NON SPECIFIED, You are low on Vitamin D please start  "2,000 IU over the counter, [employer] is a good brand!, Disp: 60 Each, Rfl: 0  •  triamterene-hctz (MAXZIDE-25/DYAZIDE) 37.5-25 MG Tab, TAKE 1 TABLET BY MOUTH EVERY DAY (Patient not taking: Reported on 7/30/2019), Disp: 90 Tab, Rfl: 3  •  triamterene-hctz (MAXZIDE-25/DYAZIDE) 37.5-25 MG Tab, Take 1 Tab by mouth every day. (Patient not taking: Reported on 7/30/2019), Disp: 30 Tab, Rfl: 3  •  benzonatate (TESSALON) 100 MG Cap, Take 1 Cap by mouth 3 times a day as needed for Cough. (Patient not taking: Reported on 11/20/2019), Disp: 60 Cap, Rfl: 0    PROBLEMS:  Patient Active Problem List    Diagnosis Date Noted   • Pharyngitis 12/27/2018   • Vitamin D deficiency 09/13/2018   • Status post total hip replacement, left 02/16/2016   • Atypical face pain    • Essential hypertension 08/11/2015   • Obesity, Class II, BMI 35-39.9, isolated 06/15/2015   • Headache 03/01/2012   • Dyslipidemia 05/22/2009   • Impaired glucose tolerance 05/22/2009   • Hypothyroid 05/22/2009       REVIEW OF SYSTEMS:  Gen.:  No Nausea, Vomiting, fever, Chills.  Heart: No chest pain.  Lungs:  No shortness of Breath.  Psychological: Mack unusual Anxiety depression     PHYSICAL EXAM   Constitutional: Alert, cooperative, not in acute distress.  Cardiovascular:  Rate Rhythm is regular without murmurs rubs clicks.     Thorax & Lungs: Clear to auscultation, no wheezing, rhonchi, or rales  HENT: Normocephalic, Atraumatic.  Eyes: PERRLA, EOMI, Conjunctiva normal.   Neck: Trachia is midline no swelling of the thyroid.   Lymphatic: No lymphadenopathy noted.   Neurologic: Alert & oriented x 3, cranial nerves II through XII are intact, Normal motor function, Normal sensory function, No focal deficits noted.   Psychiatric: Affect normal, Judgment normal, Mood normal.     VITAL SIGNS:/75   Pulse 68   Temp 36.9 °C (98.5 °F) (Temporal)   Resp 12   Ht 1.6 m (5' 3\")   Wt 86.5 kg (190 lb 9.6 oz)   SpO2 97%   BMI 33.76 kg/m²     Labs: " Reviewed    Assessment:                                                     Plan:    1. Vitamin D deficiency  Recheck vitamin D  - VITAMIN D,25 HYDROXY; Future    2. Obesity, Class II, BMI 35-39.9, isolated  Discussed weight loss    3. IGT (impaired glucose tolerance)  Recheck fasting basic metabolic panel  - Basic Metabolic Panel; Future    4. Hypothyroidism, unspecified type  Thyroid is stable

## 2020-01-07 ENCOUNTER — HOSPITAL ENCOUNTER (OUTPATIENT)
Dept: RADIOLOGY | Facility: MEDICAL CENTER | Age: 75
End: 2020-01-07
Attending: OBSTETRICS & GYNECOLOGY
Payer: MEDICARE

## 2020-01-07 DIAGNOSIS — Z12.31 VISIT FOR SCREENING MAMMOGRAM: ICD-10-CM

## 2020-01-07 PROCEDURE — 77067 SCR MAMMO BI INCL CAD: CPT

## 2020-01-28 DIAGNOSIS — I10 ESSENTIAL HYPERTENSION: ICD-10-CM

## 2020-01-28 RX ORDER — AMLODIPINE BESYLATE 5 MG/1
TABLET ORAL
Qty: 90 TAB | Refills: 1 | Status: SHIPPED | OUTPATIENT
Start: 2020-01-28 | End: 2020-07-28

## 2020-02-24 ENCOUNTER — HOSPITAL ENCOUNTER (OUTPATIENT)
Dept: LAB | Facility: MEDICAL CENTER | Age: 75
End: 2020-02-24
Attending: INTERNAL MEDICINE
Payer: MEDICARE

## 2020-02-24 DIAGNOSIS — E55.9 VITAMIN D DEFICIENCY: ICD-10-CM

## 2020-02-24 DIAGNOSIS — R73.02 IGT (IMPAIRED GLUCOSE TOLERANCE): ICD-10-CM

## 2020-02-24 LAB
ANION GAP SERPL CALC-SCNC: 9 MMOL/L (ref 0–11.9)
BUN SERPL-MCNC: 15 MG/DL (ref 8–22)
CALCIUM SERPL-MCNC: 9.7 MG/DL (ref 8.5–10.5)
CHLORIDE SERPL-SCNC: 108 MMOL/L (ref 96–112)
CO2 SERPL-SCNC: 22 MMOL/L (ref 20–33)
CREAT SERPL-MCNC: 0.72 MG/DL (ref 0.5–1.4)
GLUCOSE SERPL-MCNC: 108 MG/DL (ref 65–99)
POTASSIUM SERPL-SCNC: 4.1 MMOL/L (ref 3.6–5.5)
SODIUM SERPL-SCNC: 139 MMOL/L (ref 135–145)

## 2020-02-24 PROCEDURE — 82306 VITAMIN D 25 HYDROXY: CPT

## 2020-02-24 PROCEDURE — 80048 BASIC METABOLIC PNL TOTAL CA: CPT

## 2020-02-24 PROCEDURE — 36415 COLL VENOUS BLD VENIPUNCTURE: CPT

## 2020-02-25 LAB — 25(OH)D3 SERPL-MCNC: 37 NG/ML (ref 30–100)

## 2020-03-03 ENCOUNTER — OFFICE VISIT (OUTPATIENT)
Dept: MEDICAL GROUP | Facility: LAB | Age: 75
End: 2020-03-03
Payer: MEDICARE

## 2020-03-03 VITALS
HEART RATE: 90 BPM | RESPIRATION RATE: 14 BRPM | BODY MASS INDEX: 33.98 KG/M2 | HEIGHT: 63 IN | SYSTOLIC BLOOD PRESSURE: 150 MMHG | DIASTOLIC BLOOD PRESSURE: 70 MMHG | WEIGHT: 191.8 LBS | TEMPERATURE: 98.2 F | OXYGEN SATURATION: 98 %

## 2020-03-03 DIAGNOSIS — E55.9 VITAMIN D DEFICIENCY: ICD-10-CM

## 2020-03-03 DIAGNOSIS — M65.342 TRIGGER FINGER, LEFT RING FINGER: ICD-10-CM

## 2020-03-03 DIAGNOSIS — R73.02 IMPAIRED GLUCOSE TOLERANCE: ICD-10-CM

## 2020-03-03 DIAGNOSIS — E66.9 OBESITY, CLASS II, BMI 35-39.9, ISOLATED: ICD-10-CM

## 2020-03-03 PROCEDURE — 99214 OFFICE O/P EST MOD 30 MIN: CPT | Performed by: INTERNAL MEDICINE

## 2020-03-03 RX ORDER — MAGNESIUM OXIDE 400 MG/1
400 TABLET ORAL DAILY
COMMUNITY
End: 2022-05-20

## 2020-03-03 ASSESSMENT — PATIENT HEALTH QUESTIONNAIRE - PHQ9: CLINICAL INTERPRETATION OF PHQ2 SCORE: 0

## 2020-03-03 ASSESSMENT — FIBROSIS 4 INDEX: FIB4 SCORE: 2.12

## 2020-03-03 NOTE — PROGRESS NOTES
CC: Shikha Swift is a 74 y.o. female is suffering from   Chief Complaint   Patient presents with   • Results     3 months follow up for Vitamin D   • Finger Pain     left ring finger pain, pending surgery with Dr Alvaro Toth         SUBJECTIVE:  1. Trigger finger, left ring finger  Lauryn and I have discussed the she is doing well, is scheduled to undergo trigger finger surgery, will dictate a letter to Dr. Alvaro Toth medical release    2. Obesity, Class II, BMI 35-39.9, isolated  Patient and I have discussed weight loss.    3. Impaired glucose tolerance  Ongoing issues with impaired glucose tolerance without the development of kika diabetes medically stable    4. Vitamin D deficiency  History of vitamin D deficiency patient's to recheck vitamin D on a as needed basis        Past social, family, history: , Salazar  Social History     Tobacco Use   • Smoking status: Never Smoker   • Smokeless tobacco: Never Used   Substance Use Topics   • Alcohol use: Yes     Alcohol/week: 0.0 oz     Comment: 1-2 per month   • Drug use: No         MEDICATIONS:    Current Outpatient Medications:   •  magnesium oxide (MAG-OX) 400 MG Tab tablet, Take 400 mg by mouth every day., Disp: , Rfl:   •  amLODIPine (NORVASC) 5 MG Tab, TAKE 1 TABLET BY MOUTH EVERY DAY, Disp: 90 Tab, Rfl: 1  •  atorvastatin (LIPITOR) 10 MG Tab, TAKE 1 TABLET BY MOUTH EVERY DAY, Disp: 90 Tab, Rfl: 3  •  benazepril (LOTENSIN) 40 MG tablet, TAKE 1 TABLET BY MOUTH EVERY DAY, Disp: 90 Tab, Rfl: 3  •  levothyroxine (SYNTHROID) 100 MCG Tab, Take 1 Tab by mouth Every morning on an empty stomach., Disp: 90 Tab, Rfl: 3  •  Probiotic Product (ACIDOPHILUS PROBIOTIC BLEND PO), Take 15 mL by mouth every day., Disp: , Rfl:   •  estradiol (ESTRACE VAGINAL) 0.1 MG/GM vaginal cream, Insert 1 g in vagina Every Sunday and Wednesday., Disp: , Rfl:   •  Ibuprofen (ADVIL) 200 MG CAPS, Take 1-2 Caps by mouth 1 time daily as needed., Disp: , Rfl:   •  PROGESTERONE,  Apply 1 Application to affected area(s) every evening. Compounded cream [facility], Disp: , Rfl:   •  NON SPECIFIED, You are low on Vitamin D please start 2,000 IU over the counter, Nature Made is a good brand!, Disp: 60 Each, Rfl: 0  •  triamterene-hctz (MAXZIDE-25/DYAZIDE) 37.5-25 MG Tab, TAKE 1 TABLET BY MOUTH EVERY DAY (Patient not taking: Reported on 7/30/2019), Disp: 90 Tab, Rfl: 3  •  triamterene-hctz (MAXZIDE-25/DYAZIDE) 37.5-25 MG Tab, Take 1 Tab by mouth every day. (Patient not taking: Reported on 7/30/2019), Disp: 30 Tab, Rfl: 3  •  benzonatate (TESSALON) 100 MG Cap, Take 1 Cap by mouth 3 times a day as needed for Cough. (Patient not taking: Reported on 11/20/2019), Disp: 60 Cap, Rfl: 0  •  NON SPECIFIED, You are low on Vitamin D please start 2,000 IU over the counter, Nature Made is a good brand! Regards, Presley Black DO, Disp: 1 Each, Rfl: ea    PROBLEMS:  Patient Active Problem List    Diagnosis Date Noted   • Pharyngitis 12/27/2018   • Vitamin D deficiency 09/13/2018   • Status post total hip replacement, left 02/16/2016   • Atypical face pain    • Essential hypertension 08/11/2015   • Obesity, Class II, BMI 35-39.9, isolated 06/15/2015   • Headache 03/01/2012   • Dyslipidemia 05/22/2009   • Impaired glucose tolerance 05/22/2009   • Hypothyroid 05/22/2009       REVIEW OF SYSTEMS:  Gen.:  No Nausea, Vomiting, fever, Chills.  Heart: No chest pain.  Lungs:  No shortness of Breath.  Psychological: Mack unusual Anxiety depression     PHYSICAL EXAM   Constitutional: Alert, cooperative, not in acute distress.  Cardiovascular:  Rate Rhythm is regular without murmurs rubs clicks.     Thorax & Lungs: Clear to auscultation, no wheezing, rhonchi, or rales  HENT: Normocephalic, Atraumatic.  Eyes: PERRLA, EOMI, Conjunctiva normal.   Neck: Trachia is midline no swelling of the thyroid.   Lymphatic: No lymphadenopathy noted.   Neurologic: Alert & oriented x 3, cranial nerves II through XII are intact,  "Normal motor function, Normal sensory function, No focal deficits noted.   Psychiatric: Affect normal, Judgment normal, Mood normal.     VITAL SIGNS:/70   Pulse 90   Temp 36.8 °C (98.2 °F) (Temporal)   Resp 14   Ht 1.6 m (5' 3\")   Wt 87 kg (191 lb 12.8 oz)   SpO2 98%   BMI 33.98 kg/m²     Labs: Reviewed    Assessment:                                                     Plan:    1. Trigger finger, left ring finger  Trigger finger, left, letter dictated    2. Obesity, Class II, BMI 35-39.9, isolated  Ongoing issues with obesity encourage patient to lose weight    3. Impaired glucose tolerance  Impaired glucose tolerance, likely will improve with weight loss    4. Vitamin D deficiency  Improved vitamin D deficiency.        "

## 2020-03-03 NOTE — LETTER
March 3, 2020        Patient: Shikha Swift   YOB: 1945   Date of Visit: 3/3/2020     Alvaro Toth MD      Dear Alvaro:    It is my opinion that Shikha Swift is medically cleared for left ring finger trigger finger surgery.     If you have any questions or concerns, please don't hesitate to call.        Sincerely,        Chago Black D.O.  Electronically Signed    [facility] - [address] [id number] [phone] (Phone)  [phone] (Fax)

## 2020-03-11 DIAGNOSIS — E03.9 HYPOTHYROIDISM, UNSPECIFIED TYPE: ICD-10-CM

## 2020-03-11 RX ORDER — LEVOTHYROXINE SODIUM 0.1 MG/1
TABLET ORAL
Qty: 90 TAB | Refills: 3 | Status: SHIPPED | OUTPATIENT
Start: 2020-03-11 | End: 2021-03-10

## 2020-03-11 NOTE — TELEPHONE ENCOUNTER
Received request via: Pharmacy    Was the patient seen in the last year in this department? Yes  3/3/20  Does the patient have an active prescription (recently filled or refills available) for medication(s) requested? No

## 2020-03-28 DIAGNOSIS — I10 ESSENTIAL HYPERTENSION: ICD-10-CM

## 2020-03-31 DIAGNOSIS — I10 ESSENTIAL HYPERTENSION: ICD-10-CM

## 2020-03-31 RX ORDER — BENAZEPRIL HYDROCHLORIDE 40 MG/1
TABLET ORAL
Qty: 90 TAB | Refills: 3 | Status: SHIPPED | OUTPATIENT
Start: 2020-03-31 | End: 2021-03-15 | Stop reason: SDUPTHER

## 2020-06-12 ENCOUNTER — OFFICE VISIT (OUTPATIENT)
Dept: MEDICAL GROUP | Facility: LAB | Age: 75
End: 2020-06-12
Payer: MEDICARE

## 2020-06-12 VITALS
HEIGHT: 63 IN | RESPIRATION RATE: 12 BRPM | OXYGEN SATURATION: 97 % | WEIGHT: 195.4 LBS | TEMPERATURE: 98.8 F | DIASTOLIC BLOOD PRESSURE: 70 MMHG | BODY MASS INDEX: 34.62 KG/M2 | HEART RATE: 70 BPM | SYSTOLIC BLOOD PRESSURE: 140 MMHG

## 2020-06-12 DIAGNOSIS — E03.8 OTHER SPECIFIED HYPOTHYROIDISM: ICD-10-CM

## 2020-06-12 DIAGNOSIS — E55.9 VITAMIN D DEFICIENCY: ICD-10-CM

## 2020-06-12 DIAGNOSIS — R73.02 IMPAIRED GLUCOSE TOLERANCE: ICD-10-CM

## 2020-06-12 PROCEDURE — 99214 OFFICE O/P EST MOD 30 MIN: CPT | Performed by: INTERNAL MEDICINE

## 2020-06-12 ASSESSMENT — FIBROSIS 4 INDEX: FIB4 SCORE: 2.15

## 2020-06-12 NOTE — PROGRESS NOTES
CC: Shikha Swift is a 75 y.o. female is suffering from   Chief Complaint   Patient presents with   • Other     3 months follow up         SUBJECTIVE:  1. Vitamin D deficiency  Lauryn is here for follow-up suffers from vitamin D deficiency we will recheck levels    2. Impaired glucose tolerance  Patient with a history of impaired glucose tolerance no change in medical therapy we will recheck labs encourage patient work on weight loss    3. Other specified hypothyroidism  History of hypothyroidism, recheck free T4 TSH        Past social, family, history: , Ravinder  Social History     Tobacco Use   • Smoking status: Never Smoker   • Smokeless tobacco: Never Used   Substance Use Topics   • Alcohol use: Yes     Alcohol/week: 0.0 oz     Comment: 1-2 per month   • Drug use: No         MEDICATIONS:    Current Outpatient Medications:   •  Zinc 50 MG Cap, Take 50 mg by mouth every day., Disp: , Rfl:   •  benazepril (LOTENSIN) 40 MG tablet, TAKE 1 TABLET BY MOUTH EVERY DAY, Disp: 90 Tab, Rfl: 3  •  levothyroxine (SYNTHROID) 100 MCG Tab, TAKE 1 TABLET BY MOUTH EVERY MORNING ON AN EMPTY STOMACH, Disp: 90 Tab, Rfl: 3  •  magnesium oxide (MAG-OX) 400 MG Tab tablet, Take 400 mg by mouth every day., Disp: , Rfl:   •  amLODIPine (NORVASC) 5 MG Tab, TAKE 1 TABLET BY MOUTH EVERY DAY, Disp: 90 Tab, Rfl: 1  •  atorvastatin (LIPITOR) 10 MG Tab, TAKE 1 TABLET BY MOUTH EVERY DAY, Disp: 90 Tab, Rfl: 3  •  NON SPECIFIED, You are low on Vitamin D please start 2,000 IU over the counter, Nature Made is a good brand!, Disp: 60 Each, Rfl: 0  •  NON SPECIFIED, You are low on Vitamin D please start 2,000 IU over the counter, Nature Made is a good brand! Regards, Presley Black DO, Disp: 1 Each, Rfl: ea  •  Probiotic Product (ACIDOPHILUS PROBIOTIC BLEND PO), Take 15 mL by mouth every day., Disp: , Rfl:   •  estradiol (ESTRACE VAGINAL) 0.1 MG/GM vaginal cream, Insert 1 g in vagina Every Sunday and Wednesday., Disp: , Rfl:   •   Ibuprofen (ADVIL) 200 MG CAPS, Take 1-2 Caps by mouth 1 time daily as needed., Disp: , Rfl:   •  PROGESTERONE, Apply 1 Application to affected area(s) every evening. Compounded cream [facility], Disp: , Rfl:   •  triamterene-hctz (MAXZIDE-25/DYAZIDE) 37.5-25 MG Tab, TAKE 1 TABLET BY MOUTH EVERY DAY (Patient not taking: Reported on 7/30/2019), Disp: 90 Tab, Rfl: 3  •  triamterene-hctz (MAXZIDE-25/DYAZIDE) 37.5-25 MG Tab, Take 1 Tab by mouth every day. (Patient not taking: Reported on 7/30/2019), Disp: 30 Tab, Rfl: 3  •  benzonatate (TESSALON) 100 MG Cap, Take 1 Cap by mouth 3 times a day as needed for Cough. (Patient not taking: Reported on 11/20/2019), Disp: 60 Cap, Rfl: 0    PROBLEMS:  Patient Active Problem List    Diagnosis Date Noted   • Pharyngitis 12/27/2018   • Vitamin D deficiency 09/13/2018   • Status post total hip replacement, left 02/16/2016   • Atypical face pain    • Essential hypertension 08/11/2015   • Obesity, Class II, BMI 35-39.9, isolated 06/15/2015   • Headache 03/01/2012   • Dyslipidemia 05/22/2009   • Impaired glucose tolerance 05/22/2009   • Hypothyroid 05/22/2009       REVIEW OF SYSTEMS:  Gen.:  No Nausea, Vomiting, fever, Chills.  Heart: No chest pain.  Lungs:  No shortness of Breath.  Psychological: Mack unusual Anxiety depression     PHYSICAL EXAM   Constitutional: Alert, cooperative, not in acute distress.  Cardiovascular:  Rate Rhythm is regular without murmurs rubs clicks.     Thorax & Lungs: Clear to auscultation, no wheezing, rhonchi, or rales  HENT: Normocephalic, Atraumatic.  Eyes: PERRLA, EOMI, Conjunctiva normal.   Neck: Trachia is midline no swelling of the thyroid.   Lymphatic: No lymphadenopathy noted.   Neurologic: Alert & oriented x 3, cranial nerves II through XII are intact, Normal motor function, Normal sensory function, No focal deficits noted.   Psychiatric: Affect normal, Judgment normal, Mood normal.     VITAL SIGNS:/70   Pulse 70   Temp 37.1 °C (98.8  "°F) (Temporal)   Resp 12   Ht 1.6 m (5' 3\")   Wt 88.6 kg (195 lb 6.4 oz)   SpO2 97%   BMI 34.61 kg/m²     Labs: Reviewed    Assessment:                                                     Plan:    1. Vitamin D deficiency  Recheck vitamin D  - VITAMIN D,25 HYDROXY; Future    2. Impaired glucose tolerance  Labs ordered encourage patient work on her weight patient suffers from impaired glucose tolerance is at risk for diabetes  - Comp Metabolic Panel; Future  - CBC WITH DIFFERENTIAL; Future    3. Other specified hypothyroidism  Recheck free T4 TSH  - FREE THYROXINE; Future  - TSH; Future        "

## 2020-07-09 NOTE — TELEPHONE ENCOUNTER
Received request via: Pharmacy    Was the patient seen in the last year in this department? Yes  6/1/20  Does the patient have an active prescription (recently filled or refills available) for medication(s) requested? No

## 2020-07-10 RX ORDER — ATORVASTATIN CALCIUM 10 MG/1
TABLET, FILM COATED ORAL
Qty: 90 TAB | Refills: 3 | Status: SHIPPED | OUTPATIENT
Start: 2020-07-10 | End: 2021-07-12 | Stop reason: SDUPTHER

## 2020-07-28 DIAGNOSIS — I10 ESSENTIAL HYPERTENSION: ICD-10-CM

## 2020-07-28 RX ORDER — AMLODIPINE BESYLATE 5 MG/1
TABLET ORAL
Qty: 90 TAB | Refills: 1 | Status: SHIPPED | OUTPATIENT
Start: 2020-07-28 | End: 2021-01-20 | Stop reason: SDUPTHER

## 2020-07-28 NOTE — TELEPHONE ENCOUNTER
Received request via: Pharmacy    Was the patient seen in the last year in this department? Yes  LOV 06/12/2020  Does the patient have an active prescription (recently filled or refills available) for medication(s) requested? No

## 2020-09-24 NOTE — TELEPHONE ENCOUNTER
Eugenie:  Call Shikha, tell her that I agree with stopping Maxzide!  Regards, Presley Black, DO     Patient here for flu shot

## 2020-12-01 ENCOUNTER — HOSPITAL ENCOUNTER (OUTPATIENT)
Dept: LAB | Facility: MEDICAL CENTER | Age: 75
End: 2020-12-01
Attending: INTERNAL MEDICINE
Payer: MEDICARE

## 2020-12-01 DIAGNOSIS — E03.8 OTHER SPECIFIED HYPOTHYROIDISM: ICD-10-CM

## 2020-12-01 DIAGNOSIS — E55.9 VITAMIN D DEFICIENCY: ICD-10-CM

## 2020-12-01 DIAGNOSIS — R73.02 IMPAIRED GLUCOSE TOLERANCE: ICD-10-CM

## 2020-12-01 LAB
25(OH)D3 SERPL-MCNC: 33 NG/ML (ref 30–100)
ALBUMIN SERPL BCP-MCNC: 4.2 G/DL (ref 3.2–4.9)
ALBUMIN/GLOB SERPL: 1.5 G/DL
ALP SERPL-CCNC: 83 U/L (ref 30–99)
ALT SERPL-CCNC: 35 U/L (ref 2–50)
ANION GAP SERPL CALC-SCNC: 11 MMOL/L (ref 7–16)
AST SERPL-CCNC: 32 U/L (ref 12–45)
BASOPHILS # BLD AUTO: 0.6 % (ref 0–1.8)
BASOPHILS # BLD: 0.03 K/UL (ref 0–0.12)
BILIRUB SERPL-MCNC: 0.5 MG/DL (ref 0.1–1.5)
BUN SERPL-MCNC: 19 MG/DL (ref 8–22)
CALCIUM SERPL-MCNC: 9.8 MG/DL (ref 8.4–10.2)
CHLORIDE SERPL-SCNC: 106 MMOL/L (ref 96–112)
CO2 SERPL-SCNC: 22 MMOL/L (ref 20–33)
CREAT SERPL-MCNC: 0.63 MG/DL (ref 0.5–1.4)
EOSINOPHIL # BLD AUTO: 0.35 K/UL (ref 0–0.51)
EOSINOPHIL NFR BLD: 7.4 % (ref 0–6.9)
ERYTHROCYTE [DISTWIDTH] IN BLOOD BY AUTOMATED COUNT: 42 FL (ref 35.9–50)
FASTING STATUS PATIENT QL REPORTED: NORMAL
GLOBULIN SER CALC-MCNC: 2.8 G/DL (ref 1.9–3.5)
GLUCOSE SERPL-MCNC: 118 MG/DL (ref 65–99)
HCT VFR BLD AUTO: 40.7 % (ref 37–47)
HGB BLD-MCNC: 13.4 G/DL (ref 12–16)
IMM GRANULOCYTES # BLD AUTO: 0.01 K/UL (ref 0–0.11)
IMM GRANULOCYTES NFR BLD AUTO: 0.2 % (ref 0–0.9)
LYMPHOCYTES # BLD AUTO: 0.95 K/UL (ref 1–4.8)
LYMPHOCYTES NFR BLD: 20.1 % (ref 22–41)
MCH RBC QN AUTO: 29.5 PG (ref 27–33)
MCHC RBC AUTO-ENTMCNC: 32.9 G/DL (ref 33.6–35)
MCV RBC AUTO: 89.5 FL (ref 81.4–97.8)
MONOCYTES # BLD AUTO: 0.54 K/UL (ref 0–0.85)
MONOCYTES NFR BLD AUTO: 11.4 % (ref 0–13.4)
NEUTROPHILS # BLD AUTO: 2.85 K/UL (ref 2–7.15)
NEUTROPHILS NFR BLD: 60.3 % (ref 44–72)
NRBC # BLD AUTO: 0 K/UL
NRBC BLD-RTO: 0 /100 WBC
PLATELET # BLD AUTO: 169 K/UL (ref 164–446)
PMV BLD AUTO: 9.9 FL (ref 9–12.9)
POTASSIUM SERPL-SCNC: 4.1 MMOL/L (ref 3.6–5.5)
PROT SERPL-MCNC: 7 G/DL (ref 6–8.2)
RBC # BLD AUTO: 4.55 M/UL (ref 4.2–5.4)
SODIUM SERPL-SCNC: 139 MMOL/L (ref 135–145)
T4 FREE SERPL-MCNC: 1.43 NG/DL (ref 0.93–1.7)
TSH SERPL DL<=0.005 MIU/L-ACNC: 1.88 UIU/ML (ref 0.38–5.33)
WBC # BLD AUTO: 4.7 K/UL (ref 4.8–10.8)

## 2020-12-01 PROCEDURE — 84443 ASSAY THYROID STIM HORMONE: CPT

## 2020-12-01 PROCEDURE — 36415 COLL VENOUS BLD VENIPUNCTURE: CPT

## 2020-12-01 PROCEDURE — 85025 COMPLETE CBC W/AUTO DIFF WBC: CPT

## 2020-12-01 PROCEDURE — 82306 VITAMIN D 25 HYDROXY: CPT

## 2020-12-01 PROCEDURE — 84439 ASSAY OF FREE THYROXINE: CPT

## 2020-12-01 PROCEDURE — 80053 COMPREHEN METABOLIC PANEL: CPT

## 2020-12-07 ENCOUNTER — OFFICE VISIT (OUTPATIENT)
Dept: MEDICAL GROUP | Facility: LAB | Age: 75
End: 2020-12-07
Payer: MEDICARE

## 2020-12-07 VITALS
OXYGEN SATURATION: 98 % | HEART RATE: 71 BPM | TEMPERATURE: 97.6 F | DIASTOLIC BLOOD PRESSURE: 85 MMHG | WEIGHT: 198 LBS | RESPIRATION RATE: 14 BRPM | BODY MASS INDEX: 35.08 KG/M2 | HEIGHT: 63 IN | SYSTOLIC BLOOD PRESSURE: 155 MMHG

## 2020-12-07 DIAGNOSIS — R73.02 IMPAIRED GLUCOSE TOLERANCE: ICD-10-CM

## 2020-12-07 PROCEDURE — 99213 OFFICE O/P EST LOW 20 MIN: CPT | Performed by: INTERNAL MEDICINE

## 2020-12-07 ASSESSMENT — FIBROSIS 4 INDEX: FIB4 SCORE: 2.34

## 2020-12-07 NOTE — PROGRESS NOTES
CC: Shikha Swift is a 75 y.o. female is suffering from   Chief Complaint   Patient presents with   • Follow-Up     6 months follow up         SUBJECTIVE:  1. Impaired glucose tolerance  Lauryn is here for follow-up, continues to do well.  No real major complaints, is tolerating COVID-19 restrictions well        Past social, family, history: , Ravinder  Social History     Tobacco Use   • Smoking status: Never Smoker   • Smokeless tobacco: Never Used   Substance Use Topics   • Alcohol use: Yes     Alcohol/week: 0.0 oz     Comment: 1-2 per month   • Drug use: No         MEDICATIONS:    Current Outpatient Medications:   •  amLODIPine (NORVASC) 5 MG Tab, TAKE 1 TABLET BY MOUTH EVERY DAY, Disp: 90 Tab, Rfl: 1  •  atorvastatin (LIPITOR) 10 MG Tab, TAKE 1 TABLET BY MOUTH EVERY DAY, Disp: 90 Tab, Rfl: 3  •  Zinc 50 MG Cap, Take 50 mg by mouth every day., Disp: , Rfl:   •  benazepril (LOTENSIN) 40 MG tablet, TAKE 1 TABLET BY MOUTH EVERY DAY, Disp: 90 Tab, Rfl: 3  •  levothyroxine (SYNTHROID) 100 MCG Tab, TAKE 1 TABLET BY MOUTH EVERY MORNING ON AN EMPTY STOMACH, Disp: 90 Tab, Rfl: 3  •  magnesium oxide (MAG-OX) 400 MG Tab tablet, Take 400 mg by mouth every day., Disp: , Rfl:   •  NON SPECIFIED, You are low on Vitamin D please start 2,000 IU over the counter, Nature Made is a good brand! Regards, Presley Black DO, Disp: 1 Each, Rfl: ea  •  Probiotic Product (ACIDOPHILUS PROBIOTIC BLEND PO), Take 15 mL by mouth every day., Disp: , Rfl:   •  estradiol (ESTRACE VAGINAL) 0.1 MG/GM vaginal cream, Insert 1 g in vagina Every Sunday and Wednesday., Disp: , Rfl:   •  Ibuprofen (ADVIL) 200 MG CAPS, Take 1-2 Caps by mouth 1 time daily as needed., Disp: , Rfl:   •  PROGESTERONE, Apply 1 Application to affected area(s) every evening. Compounded cream [facility], Disp: , Rfl:   •  NON SPECIFIED, You are low on Vitamin D please start 2,000 IU over the counter, Nature Made is a good brand!, Disp: 60 Each, Rfl: 0  •   "triamterene-hctz (MAXZIDE-25/DYAZIDE) 37.5-25 MG Tab, TAKE 1 TABLET BY MOUTH EVERY DAY (Patient not taking: Reported on 7/30/2019), Disp: 90 Tab, Rfl: 3  •  triamterene-hctz (MAXZIDE-25/DYAZIDE) 37.5-25 MG Tab, Take 1 Tab by mouth every day. (Patient not taking: Reported on 7/30/2019), Disp: 30 Tab, Rfl: 3  •  benzonatate (TESSALON) 100 MG Cap, Take 1 Cap by mouth 3 times a day as needed for Cough. (Patient not taking: Reported on 11/20/2019), Disp: 60 Cap, Rfl: 0    PROBLEMS:  Patient Active Problem List    Diagnosis Date Noted   • Pharyngitis 12/27/2018   • Vitamin D deficiency 09/13/2018   • Status post total hip replacement, left 02/16/2016   • Atypical face pain    • Essential hypertension 08/11/2015   • Obesity, Class II, BMI 35-39.9, isolated 06/15/2015   • Headache 03/01/2012   • Dyslipidemia 05/22/2009   • Impaired glucose tolerance 05/22/2009   • Hypothyroid 05/22/2009       REVIEW OF SYSTEMS:  Gen.:  No Nausea, Vomiting, fever, Chills.  Heart: No chest pain.  Lungs:  No shortness of Breath.  Psychological: Mack unusual Anxiety depression     PHYSICAL EXAM   Constitutional: Alert, cooperative, not in acute distress.  Cardiovascular:  Rate Rhythm is regular without murmurs rubs clicks.     Thorax & Lungs: Clear to auscultation, no wheezing, rhonchi, or rales  HENT: Normocephalic, Atraumatic.  Eyes: PERRLA, EOMI, Conjunctiva normal.   Neck: Trachia is midline no swelling of the thyroid.   Lymphatic: No lymphadenopathy noted.   Neurologic: Alert & oriented x 3, cranial nerves II through XII are intact, Normal motor function, Normal sensory function, No focal deficits noted.   Psychiatric: Affect normal, Judgment normal, Mood normal.     VITAL SIGNS:/85   Pulse 71   Temp 36.4 °C (97.6 °F) (Temporal)   Resp 14   Ht 1.6 m (5' 3\")   Wt 89.8 kg (198 lb)   SpO2 98%   BMI 35.07 kg/m²     Labs: Reviewed    Assessment:                                                     Plan:    1. Impaired glucose " tolerance  Labs ordered for 6 months  - Comp Metabolic Panel; Future  - CBC WITH DIFFERENTIAL; Future

## 2021-01-15 DIAGNOSIS — Z23 NEED FOR VACCINATION: ICD-10-CM

## 2021-01-20 DIAGNOSIS — I10 ESSENTIAL HYPERTENSION: ICD-10-CM

## 2021-01-20 RX ORDER — AMLODIPINE BESYLATE 5 MG/1
5 TABLET ORAL
Qty: 90 TAB | Refills: 1 | Status: SHIPPED | OUTPATIENT
Start: 2021-01-20 | End: 2021-07-21

## 2021-01-20 NOTE — TELEPHONE ENCOUNTER
Received request via: Pharmacy    Was the patient seen in the last year in this department? Yes  12/7/20  Does the patient have an active prescription (recently filled or refills available) for medication(s) requested? No

## 2021-03-10 DIAGNOSIS — E03.9 HYPOTHYROIDISM, UNSPECIFIED TYPE: ICD-10-CM

## 2021-03-10 RX ORDER — LEVOTHYROXINE SODIUM 0.1 MG/1
TABLET ORAL
Qty: 90 TABLET | Refills: 3 | Status: SHIPPED | OUTPATIENT
Start: 2021-03-10 | End: 2022-03-07

## 2021-03-15 DIAGNOSIS — I10 ESSENTIAL HYPERTENSION: ICD-10-CM

## 2021-03-15 RX ORDER — BENAZEPRIL HYDROCHLORIDE 40 MG/1
40 TABLET ORAL
Qty: 90 TABLET | Refills: 3 | Status: SHIPPED | OUTPATIENT
Start: 2021-03-15 | End: 2022-03-11 | Stop reason: SDUPTHER

## 2021-03-22 ENCOUNTER — OFFICE VISIT (OUTPATIENT)
Dept: MEDICAL GROUP | Facility: LAB | Age: 76
End: 2021-03-22
Payer: MEDICARE

## 2021-03-22 VITALS
SYSTOLIC BLOOD PRESSURE: 124 MMHG | WEIGHT: 192 LBS | RESPIRATION RATE: 14 BRPM | HEART RATE: 67 BPM | HEIGHT: 63 IN | OXYGEN SATURATION: 96 % | BODY MASS INDEX: 34.02 KG/M2 | DIASTOLIC BLOOD PRESSURE: 66 MMHG | TEMPERATURE: 97.7 F

## 2021-03-22 DIAGNOSIS — Z02.4 ENCOUNTER FOR DRIVER'S LICENSE HISTORY AND PHYSICAL: ICD-10-CM

## 2021-03-22 PROCEDURE — 99212 OFFICE O/P EST SF 10 MIN: CPT | Performed by: INTERNAL MEDICINE

## 2021-03-22 ASSESSMENT — VISUAL ACUITY
OS_CC: 20/20
OD_CC: 20/25

## 2021-03-22 ASSESSMENT — FIBROSIS 4 INDEX: FIB4 SCORE: 2.34

## 2021-03-22 ASSESSMENT — PATIENT HEALTH QUESTIONNAIRE - PHQ9: CLINICAL INTERPRETATION OF PHQ2 SCORE: 0

## 2021-03-22 NOTE — PROGRESS NOTES
CC: Shikha Swift is a 75 y.o. female is suffering from   Chief Complaint   Patient presents with   • Paperwork     dmv paper         SUBJECTIVE:  1. Encounter for 's license history and physical  Lauryn is here for 's license paperwork which was completed today        Past social, family, history: , Ravinder  Social History     Tobacco Use   • Smoking status: Never Smoker   • Smokeless tobacco: Never Used   Substance Use Topics   • Alcohol use: Yes     Alcohol/week: 0.0 oz     Comment: 1-2 per month   • Drug use: No         MEDICATIONS:    Current Outpatient Medications:   •  benazepril (LOTENSIN) 40 MG tablet, Take 1 tablet by mouth every day., Disp: 90 tablet, Rfl: 3  •  levothyroxine (SYNTHROID) 100 MCG Tab, TAKE 1 TABLET BY MOUTH EVERY MORNING ON AN EMPTY STOMACH, Disp: 90 tablet, Rfl: 3  •  amLODIPine (NORVASC) 5 MG Tab, Take 1 Tab by mouth every day., Disp: 90 Tab, Rfl: 1  •  atorvastatin (LIPITOR) 10 MG Tab, TAKE 1 TABLET BY MOUTH EVERY DAY, Disp: 90 Tab, Rfl: 3  •  Zinc 50 MG Cap, Take 50 mg by mouth every day., Disp: , Rfl:   •  magnesium oxide (MAG-OX) 400 MG Tab tablet, Take 400 mg by mouth every day., Disp: , Rfl:   •  NON SPECIFIED, You are low on Vitamin D please start 2,000 IU over the counter, Nature Made is a good brand! Regards, Presley Black DO, Disp: 1 Each, Rfl: ea  •  Probiotic Product (ACIDOPHILUS PROBIOTIC BLEND PO), Take 15 mL by mouth every day., Disp: , Rfl:   •  estradiol (ESTRACE VAGINAL) 0.1 MG/GM vaginal cream, Insert 1 g in vagina Every Sunday and Wednesday., Disp: , Rfl:   •  Ibuprofen (ADVIL) 200 MG CAPS, Take 1-2 Caps by mouth 1 time daily as needed., Disp: , Rfl:   •  NON SPECIFIED, You are low on Vitamin D please start 2,000 IU over the counter, Nature Made is a good brand!, Disp: 60 Each, Rfl: 0  •  triamterene-hctz (MAXZIDE-25/DYAZIDE) 37.5-25 MG Tab, TAKE 1 TABLET BY MOUTH EVERY DAY (Patient not taking: Reported on 7/30/2019), Disp: 90 Tab, Rfl:  "3  •  triamterene-hctz (MAXZIDE-25/DYAZIDE) 37.5-25 MG Tab, Take 1 Tab by mouth every day. (Patient not taking: Reported on 7/30/2019), Disp: 30 Tab, Rfl: 3  •  benzonatate (TESSALON) 100 MG Cap, Take 1 Cap by mouth 3 times a day as needed for Cough. (Patient not taking: Reported on 11/20/2019), Disp: 60 Cap, Rfl: 0  •  PROGESTERONE, Apply 1 Application to affected area(s) every evening. Compounded cream [facility], Disp: , Rfl:     PROBLEMS:  Patient Active Problem List    Diagnosis Date Noted   • Pharyngitis 12/27/2018   • Vitamin D deficiency 09/13/2018   • Status post total hip replacement, left 02/16/2016   • Atypical face pain    • Essential hypertension 08/11/2015   • Obesity, Class II, BMI 35-39.9, isolated 06/15/2015   • Headache 03/01/2012   • Dyslipidemia 05/22/2009   • Impaired glucose tolerance 05/22/2009   • Hypothyroid 05/22/2009       REVIEW OF SYSTEMS:  Gen.:  No Nausea, Vomiting, fever, Chills.  Heart: No chest pain.  Lungs:  No shortness of Breath.  Psychological: Mack unusual Anxiety depression     PHYSICAL EXAM   Constitutional: Alert, cooperative, not in acute distress.  Cardiovascular:  Rate Rhythm is regular without murmurs rubs clicks.     Thorax & Lungs: Clear to auscultation, no wheezing, rhonchi, or rales  HENT: Normocephalic, Atraumatic.  Eyes: PERRLA, EOMI, Conjunctiva normal.   Neck: Trachia is midline no swelling of the thyroid.   Lymphatic: No lymphadenopathy noted.   Neurologic: Alert & oriented x 3, cranial nerves II through XII are intact, Normal motor function, Normal sensory function, No focal deficits noted.   Psychiatric: Affect normal, Judgment normal, Mood normal.     VITAL SIGNS:/66   Pulse 67   Temp 36.5 °C (97.7 °F) (Temporal)   Resp 14   Ht 1.6 m (5' 3\")   Wt 87.1 kg (192 lb)   SpO2 96%   BMI 34.01 kg/m²     Labs: Reviewed    Assessment:                                                     Plan:    1. Encounter for 's license history and " physical  Paperwork completed

## 2021-03-22 NOTE — LETTER
March 22, 2021        Shikha Swift  [address]  [place] [facility] [place] [address]        Dear Shikha:        If you have any questions or concerns, please don't hesitate to call.        Sincerely,        Presley Black D.O.    Electronically Signed

## 2021-05-27 ENCOUNTER — HOSPITAL ENCOUNTER (OUTPATIENT)
Dept: LAB | Facility: MEDICAL CENTER | Age: 76
End: 2021-05-27
Attending: INTERNAL MEDICINE
Payer: MEDICARE

## 2021-05-27 DIAGNOSIS — R73.02 IMPAIRED GLUCOSE TOLERANCE: ICD-10-CM

## 2021-05-27 LAB
ALBUMIN SERPL BCP-MCNC: 4.2 G/DL (ref 3.2–4.9)
ALBUMIN/GLOB SERPL: 1.4 G/DL
ALP SERPL-CCNC: 86 U/L (ref 30–99)
ALT SERPL-CCNC: 35 U/L (ref 2–50)
ANION GAP SERPL CALC-SCNC: 8 MMOL/L (ref 7–16)
AST SERPL-CCNC: 25 U/L (ref 12–45)
BASOPHILS # BLD AUTO: 0.7 % (ref 0–1.8)
BASOPHILS # BLD: 0.04 K/UL (ref 0–0.12)
BILIRUB SERPL-MCNC: 0.4 MG/DL (ref 0.1–1.5)
BUN SERPL-MCNC: 18 MG/DL (ref 8–22)
CALCIUM SERPL-MCNC: 9.6 MG/DL (ref 8.5–10.5)
CHLORIDE SERPL-SCNC: 109 MMOL/L (ref 96–112)
CO2 SERPL-SCNC: 22 MMOL/L (ref 20–33)
CREAT SERPL-MCNC: 0.68 MG/DL (ref 0.5–1.4)
EOSINOPHIL # BLD AUTO: 0.34 K/UL (ref 0–0.51)
EOSINOPHIL NFR BLD: 6 % (ref 0–6.9)
ERYTHROCYTE [DISTWIDTH] IN BLOOD BY AUTOMATED COUNT: 43.8 FL (ref 35.9–50)
GLOBULIN SER CALC-MCNC: 3 G/DL (ref 1.9–3.5)
GLUCOSE SERPL-MCNC: 112 MG/DL (ref 65–99)
HCT VFR BLD AUTO: 42.5 % (ref 37–47)
HGB BLD-MCNC: 13.7 G/DL (ref 12–16)
IMM GRANULOCYTES # BLD AUTO: 0.02 K/UL (ref 0–0.11)
IMM GRANULOCYTES NFR BLD AUTO: 0.4 % (ref 0–0.9)
LYMPHOCYTES # BLD AUTO: 1.47 K/UL (ref 1–4.8)
LYMPHOCYTES NFR BLD: 26.2 % (ref 22–41)
MCH RBC QN AUTO: 28.8 PG (ref 27–33)
MCHC RBC AUTO-ENTMCNC: 32.2 G/DL (ref 33.6–35)
MCV RBC AUTO: 89.5 FL (ref 81.4–97.8)
MONOCYTES # BLD AUTO: 0.58 K/UL (ref 0–0.85)
MONOCYTES NFR BLD AUTO: 10.3 % (ref 0–13.4)
NEUTROPHILS # BLD AUTO: 3.17 K/UL (ref 2–7.15)
NEUTROPHILS NFR BLD: 56.4 % (ref 44–72)
NRBC # BLD AUTO: 0 K/UL
NRBC BLD-RTO: 0 /100 WBC
PLATELET # BLD AUTO: 204 K/UL (ref 164–446)
PMV BLD AUTO: 10.7 FL (ref 9–12.9)
POTASSIUM SERPL-SCNC: 4.2 MMOL/L (ref 3.6–5.5)
PROT SERPL-MCNC: 7.2 G/DL (ref 6–8.2)
RBC # BLD AUTO: 4.75 M/UL (ref 4.2–5.4)
SODIUM SERPL-SCNC: 139 MMOL/L (ref 135–145)
WBC # BLD AUTO: 5.6 K/UL (ref 4.8–10.8)

## 2021-05-27 PROCEDURE — 80053 COMPREHEN METABOLIC PANEL: CPT

## 2021-05-27 PROCEDURE — 36415 COLL VENOUS BLD VENIPUNCTURE: CPT

## 2021-05-27 PROCEDURE — 85025 COMPLETE CBC W/AUTO DIFF WBC: CPT

## 2021-06-10 ENCOUNTER — OFFICE VISIT (OUTPATIENT)
Dept: MEDICAL GROUP | Facility: LAB | Age: 76
End: 2021-06-10
Payer: MEDICARE

## 2021-06-10 VITALS
TEMPERATURE: 97.3 F | SYSTOLIC BLOOD PRESSURE: 128 MMHG | OXYGEN SATURATION: 97 % | HEIGHT: 63 IN | DIASTOLIC BLOOD PRESSURE: 70 MMHG | HEART RATE: 78 BPM | BODY MASS INDEX: 34.38 KG/M2 | WEIGHT: 194 LBS | RESPIRATION RATE: 14 BRPM

## 2021-06-10 DIAGNOSIS — E66.9 OBESITY, CLASS II, BMI 35-39.9, ISOLATED: ICD-10-CM

## 2021-06-10 PROCEDURE — 99213 OFFICE O/P EST LOW 20 MIN: CPT | Performed by: INTERNAL MEDICINE

## 2021-06-10 ASSESSMENT — FIBROSIS 4 INDEX: FIB4 SCORE: 1.53

## 2021-06-10 NOTE — PROGRESS NOTES
CC: Shikha Swift is a 76 y.o. female is suffering from   Chief Complaint   Patient presents with   • Lab Results     6 months follow up         SUBJECTIVE:  1. Obesity, Class II, BMI 35-39.9, isolated  Lauryn is here for follow-up, is doing well, no major medical issues, states it has been a difficult 4 months with her  being sick        Past social, family, history: , Salazar, also present  Social History     Tobacco Use   • Smoking status: Never Smoker   • Smokeless tobacco: Never Used   Substance Use Topics   • Alcohol use: Yes     Alcohol/week: 0.0 oz     Comment: 1-2 per month   • Drug use: No         MEDICATIONS:    Current Outpatient Medications:   •  benazepril (LOTENSIN) 40 MG tablet, Take 1 tablet by mouth every day., Disp: 90 tablet, Rfl: 3  •  levothyroxine (SYNTHROID) 100 MCG Tab, TAKE 1 TABLET BY MOUTH EVERY MORNING ON AN EMPTY STOMACH, Disp: 90 tablet, Rfl: 3  •  amLODIPine (NORVASC) 5 MG Tab, Take 1 Tab by mouth every day., Disp: 90 Tab, Rfl: 1  •  atorvastatin (LIPITOR) 10 MG Tab, TAKE 1 TABLET BY MOUTH EVERY DAY, Disp: 90 Tab, Rfl: 3  •  Zinc 50 MG Cap, Take 50 mg by mouth every day., Disp: , Rfl:   •  magnesium oxide (MAG-OX) 400 MG Tab tablet, Take 400 mg by mouth every day., Disp: , Rfl:   •  Probiotic Product (ACIDOPHILUS PROBIOTIC BLEND PO), Take 15 mL by mouth every day., Disp: , Rfl:   •  estradiol (ESTRACE VAGINAL) 0.1 MG/GM vaginal cream, Insert 1 g in vagina Every Sunday and Wednesday., Disp: , Rfl:   •  PROGESTERONE, Apply 1 Application to affected area(s) every evening. Compounded cream [facility], Disp: , Rfl:   •  NON SPECIFIED, You are low on Vitamin D please start 2,000 IU over the counter, Nature Made is a good brand!, Disp: 60 Each, Rfl: 0  •  triamterene-hctz (MAXZIDE-25/DYAZIDE) 37.5-25 MG Tab, TAKE 1 TABLET BY MOUTH EVERY DAY (Patient not taking: Reported on 7/30/2019), Disp: 90 Tab, Rfl: 3  •  triamterene-hctz (MAXZIDE-25/DYAZIDE) 37.5-25 MG  "Tab, Take 1 Tab by mouth every day. (Patient not taking: Reported on 7/30/2019), Disp: 30 Tab, Rfl: 3  •  benzonatate (TESSALON) 100 MG Cap, Take 1 Cap by mouth 3 times a day as needed for Cough. (Patient not taking: Reported on 11/20/2019), Disp: 60 Cap, Rfl: 0  •  NON SPECIFIED, You are low on Vitamin D please start 2,000 IU over the counter, Nature Made is a good brand! Regards, Presley Black DO, Disp: 1 Each, Rfl: ea  •  Ibuprofen (ADVIL) 200 MG CAPS, Take 1-2 Caps by mouth 1 time daily as needed., Disp: , Rfl:     PROBLEMS:  Patient Active Problem List    Diagnosis Date Noted   • Pharyngitis 12/27/2018   • Vitamin D deficiency 09/13/2018   • Status post total hip replacement, left 02/16/2016   • Atypical face pain    • Essential hypertension 08/11/2015   • Obesity, Class II, BMI 35-39.9, isolated 06/15/2015   • Headache 03/01/2012   • Dyslipidemia 05/22/2009   • Impaired glucose tolerance 05/22/2009   • Hypothyroid 05/22/2009       REVIEW OF SYSTEMS:  Gen.:  No Nausea, Vomiting, fever, Chills.  Heart: No chest pain.  Lungs:  No shortness of Breath.  Psychological: Mack unusual Anxiety depression     PHYSICAL EXAM   Constitutional: Alert, cooperative, not in acute distress.  Cardiovascular:  Rate Rhythm is regular without murmurs rubs clicks.     Thorax & Lungs: Clear to auscultation, no wheezing, rhonchi, or rales  HENT: Normocephalic, Atraumatic.  Eyes: PERRLA, EOMI, Conjunctiva normal.   Neck: Trachia is midline no swelling of the thyroid.   Lymphatic: No lymphadenopathy noted.   Neurologic: Alert & oriented x 3, cranial nerves II through XII are intact, Normal motor function, Normal sensory function, No focal deficits noted.   Psychiatric: Affect normal, Judgment normal, Mood normal.     VITAL SIGNS:/70   Pulse 78   Temp 36.3 °C (97.3 °F) (Temporal)   Resp 14   Ht 1.6 m (5' 3\")   Wt 88 kg (194 lb)   SpO2 97%   BMI 34.37 kg/m²     Labs: Reviewed    Assessment:                                 "                     Plan:    1. Obesity, Class II, BMI 35-39.9, isolated  Encourage patient to continue work on on weight loss.  States she is exercising's trying to watch her diet.  - Patient identified as having weight management issue.  Appropriate orders and counseling given.

## 2021-07-12 RX ORDER — ATORVASTATIN CALCIUM 10 MG/1
10 TABLET, FILM COATED ORAL
Qty: 90 TABLET | Refills: 3 | Status: SHIPPED | OUTPATIENT
Start: 2021-07-12 | End: 2022-07-05 | Stop reason: SDUPTHER

## 2021-07-12 NOTE — TELEPHONE ENCOUNTER
Received request via: Pharmacy    Was the patient seen in the last year in this department? Yes  6/10/2021  Does the patient have an active prescription (recently filled or refills available) for medication(s) requested? No

## 2021-07-21 DIAGNOSIS — I10 ESSENTIAL HYPERTENSION: ICD-10-CM

## 2021-07-21 RX ORDER — AMLODIPINE BESYLATE 5 MG/1
TABLET ORAL
Qty: 90 TABLET | Refills: 3 | Status: SHIPPED | OUTPATIENT
Start: 2021-07-21 | End: 2022-07-19

## 2021-07-22 ENCOUNTER — OFFICE VISIT (OUTPATIENT)
Dept: URGENT CARE | Facility: CLINIC | Age: 76
End: 2021-07-22
Payer: MEDICARE

## 2021-07-22 VITALS
HEART RATE: 75 BPM | RESPIRATION RATE: 14 BRPM | SYSTOLIC BLOOD PRESSURE: 148 MMHG | TEMPERATURE: 98 F | WEIGHT: 194 LBS | OXYGEN SATURATION: 98 % | HEIGHT: 63 IN | BODY MASS INDEX: 34.38 KG/M2 | DIASTOLIC BLOOD PRESSURE: 80 MMHG

## 2021-07-22 DIAGNOSIS — B97.89 VIRAL RESPIRATORY ILLNESS: ICD-10-CM

## 2021-07-22 DIAGNOSIS — J01.90 ACUTE NON-RECURRENT SINUSITIS, UNSPECIFIED LOCATION: ICD-10-CM

## 2021-07-22 DIAGNOSIS — J98.8 VIRAL RESPIRATORY ILLNESS: ICD-10-CM

## 2021-07-22 DIAGNOSIS — R09.81 SINUS CONGESTION: ICD-10-CM

## 2021-07-22 PROCEDURE — 99213 OFFICE O/P EST LOW 20 MIN: CPT | Performed by: PHYSICIAN ASSISTANT

## 2021-07-22 RX ORDER — DOXYCYCLINE HYCLATE 100 MG
100 TABLET ORAL 2 TIMES DAILY
Qty: 14 TABLET | Refills: 0 | Status: SHIPPED | OUTPATIENT
Start: 2021-07-22 | End: 2021-07-29

## 2021-07-22 ASSESSMENT — ENCOUNTER SYMPTOMS
SINUS PRESSURE: 1
EYE REDNESS: 0
MYALGIAS: 0
HEADACHES: 1
NAUSEA: 0
VOMITING: 0
SORE THROAT: 0
EYE DISCHARGE: 0
SHORTNESS OF BREATH: 0
DIARRHEA: 0
COUGH: 1
FEVER: 0

## 2021-07-22 ASSESSMENT — FIBROSIS 4 INDEX: FIB4 SCORE: 1.53

## 2021-07-22 NOTE — PROGRESS NOTES
Subjective:      Lauryn Swift is a 76 y.o. female who presents with Sinus Problem (x 2 weeks, sinus congestion, post-nasal drip, cough)        This is a new problem.  The patient presents to clinic complaining of sinus congestion x2 weeks.  The patient states she developed flulike symptoms with an associated fever x2 weeks ago.  The patient states her flulike symptoms and fever are now resolved.  The patient reports continued sinus congestion with associated pain/pressure.  The patient also reports a slight intermittent dry cough, which she attributes to postnasal drip.  The patient reports no recurrent or persistent fever.  No ear pain.  No sore throat.  No shortness of breath.  No wheezing.  No chest pain.  The patient has taken OTC Robitussin and OTC Advil for her current symptoms.    Sinus Problem  This is a new problem. Episode onset: x 2 weeks ago. The problem is unchanged. There has been no fever. Associated symptoms include congestion, coughing (The patient reports an intermittent dry cough, which she attributes to postnasal drip), headaches (The patient reports a slight headache secondary to sinus pain/pressure.) and sinus pressure. Pertinent negatives include no ear pain, shortness of breath or sore throat. Treatments tried: OTC Robitussin and OTC Advil.     The patient reports no known exposure to COVID-19.  The patient was not previously tested for COVID-19 given her flulike symptoms.  The patient is not vaccinated against COVID-19.  The patient's  is experiencing similar symptoms.    PMH:  has a past medical history of Atypical face pain, Cold (), Headache(784.0) (3/1/2012), Hypertension, Hypothyroid, and Infectious disease.  MEDS:   Current Outpatient Medications:   •  amLODIPine (NORVASC) 5 MG Tab, TAKE 1 TABLET BY MOUTH EVERY DAY, Disp: 90 tablet, Rfl: 3  •  atorvastatin (LIPITOR) 10 MG Tab, Take 1 tablet by mouth every day., Disp: 90 tablet, Rfl: 3  •  benazepril (LOTENSIN)  40 MG tablet, Take 1 tablet by mouth every day., Disp: 90 tablet, Rfl: 3  •  levothyroxine (SYNTHROID) 100 MCG Tab, TAKE 1 TABLET BY MOUTH EVERY MORNING ON AN EMPTY STOMACH, Disp: 90 tablet, Rfl: 3  •  Zinc 50 MG Cap, Take 50 mg by mouth every day., Disp: , Rfl:   •  magnesium oxide (MAG-OX) 400 MG Tab tablet, Take 400 mg by mouth every day., Disp: , Rfl:   •  NON SPECIFIED, You are low on Vitamin D please start 2,000 IU over the counter, Nature Made is a good brand!, Disp: 60 Each, Rfl: 0  •  NON SPECIFIED, You are low on Vitamin D please start 2,000 IU over the counter, Nature Made is a good brand! Regards, Presley Black DO, Disp: 1 Each, Rfl: ea  •  Probiotic Product (ACIDOPHILUS PROBIOTIC BLEND PO), Take 15 mL by mouth every day., Disp: , Rfl:   •  estradiol (ESTRACE VAGINAL) 0.1 MG/GM vaginal cream, Insert 1 g in vagina Every Sunday and Wednesday., Disp: , Rfl:   •  Ibuprofen (ADVIL) 200 MG CAPS, Take 1-2 Caps by mouth 1 time daily as needed., Disp: , Rfl:   •  PROGESTERONE, Apply 1 Application to affected area(s) every evening. Compounded cream [facility], Disp: , Rfl:   •  triamterene-hctz (MAXZIDE-25/DYAZIDE) 37.5-25 MG Tab, TAKE 1 TABLET BY MOUTH EVERY DAY (Patient not taking: Reported on 7/30/2019), Disp: 90 Tab, Rfl: 3  •  triamterene-hctz (MAXZIDE-25/DYAZIDE) 37.5-25 MG Tab, Take 1 Tab by mouth every day. (Patient not taking: Reported on 7/30/2019), Disp: 30 Tab, Rfl: 3  •  benzonatate (TESSALON) 100 MG Cap, Take 1 Cap by mouth 3 times a day as needed for Cough. (Patient not taking: Reported on 11/20/2019), Disp: 60 Cap, Rfl: 0  ALLERGIES:   Allergies   Allergen Reactions   • Hydrocodone Vomiting and Nausea     [id number]     • Percocet [Oxycodone-Acetaminophen] Vomiting and Nausea     [id number]   • Vicodin [Hydrocodone-Acetaminophen] Vomiting and Nausea     [id number]     SURGHX:   Past Surgical History:   Procedure Laterality Date   • HIP ARTHROPLASTY TOTAL Left 2/16/2016    Procedure: HIP  "ARTHROPLASTY TOTAL;  Surgeon: Addy Chen M.D.;  [place] [facility] [place]:    • TRIGGER FINGER RELEASE  11/17/2009    Performed by JOSE ANGEL SRIVASTAVA at SURGERY [facility]   • TRIGGER FINGER RELEASE  3/2009    left index and middle   • PB PART EXCIS PLANTAR FASCIA  2004    left   • THYROIDECTOMY  as child    goiter   • TONSILLECTOMY  as child   • US-NEEDLE CORE BX-BREAST PANEL      right     SOCHX:  reports that she has never smoked. She has never used smokeless tobacco. She reports current alcohol use. She reports that she does not use drugs.  FH: Family history was reviewed, no pertinent findings to report      Review of Systems   Constitutional: Negative for fever.   HENT: Positive for congestion and sinus pressure. Negative for ear pain and sore throat.    Eyes: Negative for discharge and redness.   Respiratory: Positive for cough (The patient reports an intermittent dry cough, which she attributes to postnasal drip). Negative for shortness of breath.    Cardiovascular: Negative for chest pain and leg swelling.   Gastrointestinal: Negative for diarrhea, nausea and vomiting.   Musculoskeletal: Negative for myalgias.   Skin: Negative for rash.   Neurological: Positive for headaches (The patient reports a slight headache secondary to sinus pain/pressure.).          Objective:     /80 (BP Location: Left arm, Patient Position: Sitting, BP Cuff Size: Adult)   Pulse 75   Temp 36.7 °C (98 °F) (Temporal)   Resp 14   Ht 1.6 m (5' 3\")   Wt 88 kg (194 lb)   SpO2 98%   BMI 34.37 kg/m²      Physical Exam  Constitutional:       General: She is not in acute distress.     Appearance: Normal appearance. She is not ill-appearing.   HENT:      Head: Normocephalic and atraumatic.      Right Ear: Tympanic membrane, ear canal and external ear normal.      Left Ear: Tympanic membrane, ear canal and external ear normal.      Nose: Mucosal edema present.      Right Sinus: No maxillary sinus " tenderness or frontal sinus tenderness.      Left Sinus: No maxillary sinus tenderness or frontal sinus tenderness.      Mouth/Throat:      Mouth: Mucous membranes are moist.      Pharynx: Oropharynx is clear. Uvula midline. No posterior oropharyngeal erythema.      Tonsils: No tonsillar exudate.   Eyes:      Extraocular Movements: Extraocular movements intact.      Conjunctiva/sclera: Conjunctivae normal.   Cardiovascular:      Rate and Rhythm: Normal rate and regular rhythm.      Heart sounds: Normal heart sounds.   Pulmonary:      Effort: Pulmonary effort is normal. No respiratory distress.      Breath sounds: Normal breath sounds. No wheezing.   Musculoskeletal:         General: Normal range of motion.      Cervical back: Normal range of motion and neck supple.   Skin:     General: Skin is warm and dry.   Neurological:      Mental Status: She is alert and oriented to person, place, and time.                      Assessment/Plan:          1. Viral respiratory illness    2. Sinus congestion    3. Acute non-recurrent sinusitis, unspecified location  - doxycycline (VIBRAMYCIN) 100 MG Tab; Take 1 tablet by mouth 2 times a day for 7 days.  Dispense: 14 tablet; Refill: 0    The patient's presenting symptoms and physical exam findings are consistent with a viral respiratory illness with associated sinus congestion.  The patient's physical exam today in clinic was normal with the exception of mild mucosal edema to the nasal passages.  The patient's lungs are clear to auscultation without wheezing, and her pulse ox was within normal limits.  The patient appears in no acute distress.  Patient's vital signs are stable and within normal limits.  She is afebrile today in clinic.  Advised the patient that her initial symptoms were likely related to a viral respiratory illness.  Given the prolonged duration of the patient's symptoms, the patient has likely developed a subsequent sinus infection.  Will prescribe the patient  doxycycline for presumed sinus infection.  Based on the patient's presenting symptoms and physical exam findings, I have low suspicion for an acute lower respiratory tract infection.  Advised patient to monitor for worsening signs or symptoms.  Recommend OTC medications and supportive care for symptomatic management.  Recommend patient follow-up with PCP as needed.  Discussed return precautions with patient, and she verbalized understanding.    Differential diagnoses, supportive care, and indications for immediate follow-up discussed with patient.   Instructed to return to clinic or nearest emergency department for any change in condition, further concerns, or worsening of symptoms.    OTC Tylenol or Motrin for fever/discomfort.  OTC cough/cold medication for symptomatic relief -such as Mucinex  OTC Flonase for symptomatic relief  OTC Supportive Care for Congestion - saline nasal spray or neti pot  Drink plenty of fluids  Follow-up with PCP  Return to clinic or go to the ED if symptoms worsen or fail to improve, or if the patient should develop worsening/increasing sinus pain/pressure, congestion, ear pain, sore throat, headache, cough, shortness of breath, wheezing, chest pain, fever/chills, and/or any concerning symptoms.    Discussed plan with the patient, and she agrees to the above.     I personally reviewed prior external notes and test results pertinent to today's visit.  I have independently reviewed and interpreted all diagnostics ordered during this urgent care visit.     Time spent evaluating this patient was at least 30 minutes and includes preparing for visit, obtaining history, exam and evaluation, ordering labs/tests/procedures/medications, independent interpretation, and counseling/education.    Please note that this dictation was created using voice recognition software. I have made every reasonable attempt to correct obvious errors, but I expect that there may be errors of grammar and possibly  content that I did not discover before finalizing the note.     This note was electronically signed by Carola Cano PA-C

## 2021-07-22 NOTE — NON-PROVIDER
Patient reports having a fever, chills, and aches around the 4th of July, but those symptoms have resolved entirely.

## 2021-10-07 ENCOUNTER — HOSPITAL ENCOUNTER (OUTPATIENT)
Dept: RADIOLOGY | Facility: MEDICAL CENTER | Age: 76
End: 2021-10-07
Attending: OBSTETRICS & GYNECOLOGY
Payer: MEDICARE

## 2021-10-07 DIAGNOSIS — Z12.31 VISIT FOR SCREENING MAMMOGRAM: ICD-10-CM

## 2021-10-07 PROCEDURE — 77063 BREAST TOMOSYNTHESIS BI: CPT

## 2021-10-20 ENCOUNTER — TELEPHONE (OUTPATIENT)
Dept: MEDICAL GROUP | Facility: LAB | Age: 76
End: 2021-10-20

## 2021-10-20 ENCOUNTER — APPOINTMENT (OUTPATIENT)
Dept: MEDICAL GROUP | Facility: LAB | Age: 76
End: 2021-10-20
Payer: COMMERCIAL

## 2021-10-20 DIAGNOSIS — E03.9 HYPOTHYROIDISM, UNSPECIFIED TYPE: ICD-10-CM

## 2021-10-20 DIAGNOSIS — E78.5 DYSLIPIDEMIA: ICD-10-CM

## 2021-10-20 NOTE — TELEPHONE ENCOUNTER
Patient was seen at the same time as her  Ravinder, we have discussed that she is trying to lose weight, has not been successful, discussed that she should have her cholesterol rechecked also recheck thyroid function, we will have her follow-up in 3 months with her .  Rate rhythm is normal lungs are clear to auscultation.

## 2022-03-05 DIAGNOSIS — E03.9 HYPOTHYROIDISM, UNSPECIFIED TYPE: ICD-10-CM

## 2022-03-07 RX ORDER — LEVOTHYROXINE SODIUM 0.1 MG/1
TABLET ORAL
Qty: 90 TABLET | Refills: 3 | Status: SHIPPED | OUTPATIENT
Start: 2022-03-07 | End: 2023-02-27

## 2022-03-07 NOTE — TELEPHONE ENCOUNTER
Received request via: Pharmacy    Was the patient seen in the last year in this department? Yes  LOV : 6/10/2021  Does the patient have an active prescription (recently filled or refills available) for medication(s) requested? No

## 2022-03-11 DIAGNOSIS — I10 ESSENTIAL HYPERTENSION: ICD-10-CM

## 2022-03-11 RX ORDER — BENAZEPRIL HYDROCHLORIDE 40 MG/1
40 TABLET ORAL
Qty: 30 TABLET | Refills: 0 | Status: SHIPPED | OUTPATIENT
Start: 2022-03-11 | End: 2022-03-14

## 2022-03-12 NOTE — PROGRESS NOTES
Rosalva:  Please call Lauryn let her know that I refilled her benazepril!  Regards, Presley Black, DO

## 2022-03-12 NOTE — TELEPHONE ENCOUNTER
Received request via: Pharmacy    Was the patient seen in the last year in this department? Yes  LOV : 6/10/2021  Does the patient have an active prescription (recently filled or refills available) for medication(s) requested? Yes.   normal (ped)...

## 2022-03-14 RX ORDER — BENAZEPRIL HYDROCHLORIDE 40 MG/1
40 TABLET ORAL
Qty: 90 TABLET | Refills: 3 | Status: SHIPPED | OUTPATIENT
Start: 2022-03-14 | End: 2022-04-04 | Stop reason: SDUPTHER

## 2022-03-31 ENCOUNTER — TELEPHONE (OUTPATIENT)
Dept: MEDICAL GROUP | Facility: LAB | Age: 77
End: 2022-03-31
Payer: MEDICARE

## 2022-03-31 DIAGNOSIS — Z12.11 SCREEN FOR COLON CANCER: ICD-10-CM

## 2022-04-04 DIAGNOSIS — I10 ESSENTIAL HYPERTENSION: ICD-10-CM

## 2022-04-04 RX ORDER — BENAZEPRIL HYDROCHLORIDE 40 MG/1
40 TABLET ORAL
Qty: 90 TABLET | Refills: 3 | Status: SHIPPED | OUTPATIENT
Start: 2022-04-04 | End: 2023-04-07 | Stop reason: SDUPTHER

## 2022-05-06 ENCOUNTER — HOSPITAL ENCOUNTER (OUTPATIENT)
Dept: LAB | Facility: MEDICAL CENTER | Age: 77
End: 2022-05-06
Attending: INTERNAL MEDICINE
Payer: MEDICARE

## 2022-05-06 DIAGNOSIS — E78.5 DYSLIPIDEMIA: ICD-10-CM

## 2022-05-06 DIAGNOSIS — E03.9 HYPOTHYROIDISM, UNSPECIFIED TYPE: ICD-10-CM

## 2022-05-06 LAB
CHOLEST SERPL-MCNC: 157 MG/DL (ref 100–199)
FASTING STATUS PATIENT QL REPORTED: NORMAL
HDLC SERPL-MCNC: 43 MG/DL
LDLC SERPL CALC-MCNC: 95 MG/DL
T4 FREE SERPL-MCNC: 1.46 NG/DL (ref 0.93–1.7)
TRIGL SERPL-MCNC: 93 MG/DL (ref 0–149)
TSH SERPL DL<=0.005 MIU/L-ACNC: 1.14 UIU/ML (ref 0.38–5.33)

## 2022-05-06 PROCEDURE — 84439 ASSAY OF FREE THYROXINE: CPT

## 2022-05-06 PROCEDURE — 36415 COLL VENOUS BLD VENIPUNCTURE: CPT

## 2022-05-06 PROCEDURE — 84443 ASSAY THYROID STIM HORMONE: CPT

## 2022-05-06 PROCEDURE — 80061 LIPID PANEL: CPT

## 2022-05-11 ENCOUNTER — OFFICE VISIT (OUTPATIENT)
Dept: MEDICAL GROUP | Facility: LAB | Age: 77
End: 2022-05-11
Payer: MEDICARE

## 2022-05-11 VITALS
SYSTOLIC BLOOD PRESSURE: 135 MMHG | BODY MASS INDEX: 33.95 KG/M2 | OXYGEN SATURATION: 97 % | WEIGHT: 191.6 LBS | DIASTOLIC BLOOD PRESSURE: 75 MMHG | HEIGHT: 63 IN | HEART RATE: 75 BPM | TEMPERATURE: 96.4 F

## 2022-05-11 DIAGNOSIS — E66.9 OBESITY, CLASS II, BMI 35-39.9, ISOLATED: ICD-10-CM

## 2022-05-11 DIAGNOSIS — E55.9 VITAMIN D DEFICIENCY: ICD-10-CM

## 2022-05-11 PROCEDURE — 99213 OFFICE O/P EST LOW 20 MIN: CPT | Performed by: INTERNAL MEDICINE

## 2022-05-11 ASSESSMENT — PATIENT HEALTH QUESTIONNAIRE - PHQ9: CLINICAL INTERPRETATION OF PHQ2 SCORE: 0

## 2022-05-11 ASSESSMENT — FIBROSIS 4 INDEX: FIB4 SCORE: 1.55

## 2022-05-12 NOTE — PROGRESS NOTES
CC: Shikha Swift is a 77 y.o. female is suffering from   Chief Complaint   Patient presents with   • Follow-Up         SUBJECTIVE:  1. Obesity, Class II, BMI 35-39.9, isolated  Lauryn is here for follow-up, continues to have problems with her weight, encourage patient to do the best she can with weight loss.    2. Vitamin D deficiency  History of vitamin D deficiency recheck levels        Past social, family, history: , Ravinder  Social History     Tobacco Use   • Smoking status: Never Smoker   • Smokeless tobacco: Never Used   Substance Use Topics   • Alcohol use: Yes     Alcohol/week: 0.0 oz     Comment: 1-2 per month   • Drug use: No         MEDICATIONS:    Current Outpatient Medications:   •  benazepril (LOTENSIN) 40 MG tablet, Take 1 Tablet by mouth every day., Disp: 90 Tablet, Rfl: 3  •  levothyroxine (SYNTHROID) 100 MCG Tab, TAKE 1 TABLET BY MOUTH EVERY MORNING ON AN EMPTY STOMACH, Disp: 90 Tablet, Rfl: 3  •  amLODIPine (NORVASC) 5 MG Tab, TAKE 1 TABLET BY MOUTH EVERY DAY, Disp: 90 tablet, Rfl: 3  •  atorvastatin (LIPITOR) 10 MG Tab, Take 1 tablet by mouth every day., Disp: 90 tablet, Rfl: 3  •  Zinc 50 MG Cap, Take 50 mg by mouth every day., Disp: , Rfl:   •  magnesium oxide (MAG-OX) 400 MG Tab tablet, Take 400 mg by mouth every day., Disp: , Rfl:   •  NON SPECIFIED, You are low on Vitamin D please start 2,000 IU over the counter, [employer] Made is a good brand!, Disp: 60 Each, Rfl: 0  •  NON SPECIFIED, You are low on Vitamin D please start 2,000 IU over the counter, [employer] is a good brand! Regards, Presley Black DO, Disp: 1 Each, Rfl: ea  •  Probiotic Product (ACIDOPHILUS PROBIOTIC BLEND PO), Take 15 mL by mouth every day., Disp: , Rfl:   •  estradiol (ESTRACE) 0.1 MG/GM vaginal cream, Insert 1 g in vagina Every Sunday and Wednesday., Disp: , Rfl:   •  Ibuprofen 200 MG Cap, Take 1-2 Caps by mouth 1 time daily as needed., Disp: , Rfl:   •  PROGESTERONE, Apply 1 Application to affected  "area(s) every evening. Compounded cream [facility], Disp: , Rfl:     PROBLEMS:  Patient Active Problem List    Diagnosis Date Noted   • Pharyngitis 12/27/2018   • Vitamin D deficiency 09/13/2018   • Status post total hip replacement, left 02/16/2016   • Atypical face pain    • Essential hypertension 08/11/2015   • Obesity, Class II, BMI 35-39.9, isolated 06/15/2015   • Headache 03/01/2012   • Dyslipidemia 05/22/2009   • Impaired glucose tolerance 05/22/2009   • Hypothyroid 05/22/2009       REVIEW OF SYSTEMS:  Gen.:  No Nausea, Vomiting, fever, Chills.  Heart: No chest pain.  Lungs:  No shortness of Breath.  Psychological: Mack unusual Anxiety depression     PHYSICAL EXAM   Constitutional: Alert, cooperative, not in acute distress.  Cardiovascular:  Rate Rhythm is regular without murmurs rubs clicks.     Thorax & Lungs: Clear to auscultation, no wheezing, rhonchi, or rales  HENT: Normocephalic, Atraumatic.  Eyes: PERRLA, EOMI, Conjunctiva normal.   Neck: Trachia is midline no swelling of the thyroid.   Neurologic: Alert & oriented x 3, cranial nerves II through XII are intact, Normal motor function, Normal sensory function, No focal deficits noted.   Psychiatric: Affect normal, Judgment normal, Mood normal.     VITAL SIGNS:/75   Pulse 75   Temp (!) 35.8 °C (96.4 °F) (Temporal)   Ht 1.6 m (5' 3\")   Wt 86.9 kg (191 lb 9.6 oz)   SpO2 97%   BMI 33.94 kg/m²     Labs: Reviewed    Assessment:                                                     Plan:    1. Obesity, Class II, BMI 35-39.9, isolated  Patient and I have discussed working on diet and exercise as best she can recheck lipid profile  - OBESITY COUNSELING (No Charge): Patient identified as having weight management issue.  Appropriate orders and counseling given.  - Lipid Profile; Future  - VITAMIN D,25 HYDROXY; Future    2. Vitamin D deficiency  Check vitamin D in 6 months  - Lipid Profile; Future  - VITAMIN D,25 HYDROXY; Future        "

## 2022-05-20 ENCOUNTER — OFFICE VISIT (OUTPATIENT)
Dept: URGENT CARE | Facility: CLINIC | Age: 77
End: 2022-05-20
Payer: MEDICARE

## 2022-05-20 VITALS
RESPIRATION RATE: 16 BRPM | OXYGEN SATURATION: 98 % | BODY MASS INDEX: 33.84 KG/M2 | WEIGHT: 191 LBS | TEMPERATURE: 98.6 F | HEIGHT: 63 IN | SYSTOLIC BLOOD PRESSURE: 138 MMHG | DIASTOLIC BLOOD PRESSURE: 70 MMHG | HEART RATE: 74 BPM

## 2022-05-20 DIAGNOSIS — H66.001 NON-RECURRENT ACUTE SUPPURATIVE OTITIS MEDIA OF RIGHT EAR WITHOUT SPONTANEOUS RUPTURE OF TYMPANIC MEMBRANE: ICD-10-CM

## 2022-05-20 PROCEDURE — 99213 OFFICE O/P EST LOW 20 MIN: CPT | Performed by: PHYSICIAN ASSISTANT

## 2022-05-20 RX ORDER — AMOXICILLIN 875 MG/1
875 TABLET, COATED ORAL 2 TIMES DAILY
Qty: 14 TABLET | Refills: 0 | Status: SHIPPED | OUTPATIENT
Start: 2022-05-20 | End: 2022-05-27

## 2022-05-20 ASSESSMENT — ENCOUNTER SYMPTOMS
HEADACHES: 0
COUGH: 0
FEVER: 0
CHILLS: 0

## 2022-05-20 ASSESSMENT — FIBROSIS 4 INDEX: FIB4 SCORE: 1.55

## 2022-05-20 NOTE — PROGRESS NOTES
"  Subjective:   Shikha Swift is a 77 y.o. female who presents today with   Chief Complaint   Patient presents with   • Ear Fullness     2x days Right ear \"Feels like constant vibration in my ear\"        Ear Fullness  This is a new problem. Episode onset: 2 days. The problem occurs constantly. The problem has been unchanged. Pertinent negatives include no chills, congestion, coughing, fever or headaches. Treatments tried: allergy medication OTC. The treatment provided no relief.     PMH:  has a past medical history of Atypical face pain, Cold (), Headache(784.0) (3/1/2012), Hypertension, Hypothyroid, and Infectious disease.  MEDS:   Current Outpatient Medications:   •  amoxicillin (AMOXIL) 875 MG tablet, Take 1 Tablet by mouth 2 times a day for 7 days., Disp: 14 Tablet, Rfl: 0  •  benazepril (LOTENSIN) 40 MG tablet, Take 1 Tablet by mouth every day., Disp: 90 Tablet, Rfl: 3  •  levothyroxine (SYNTHROID) 100 MCG Tab, TAKE 1 TABLET BY MOUTH EVERY MORNING ON AN EMPTY STOMACH, Disp: 90 Tablet, Rfl: 3  •  amLODIPine (NORVASC) 5 MG Tab, TAKE 1 TABLET BY MOUTH EVERY DAY, Disp: 90 tablet, Rfl: 3  •  atorvastatin (LIPITOR) 10 MG Tab, Take 1 tablet by mouth every day., Disp: 90 tablet, Rfl: 3  •  estradiol (ESTRACE) 0.1 MG/GM vaginal cream, Insert 1 g in vagina Every Sunday and Wednesday., Disp: , Rfl:   •  Ibuprofen 200 MG Cap, Take 1-2 Caps by mouth 1 time daily as needed., Disp: , Rfl:   •  PROGESTERONE, Apply 1 Application to affected area(s) every evening. Compounded cream [facility], Disp: , Rfl:   ALLERGIES:   Allergies   Allergen Reactions   • Hydrocodone Vomiting and Nausea     [id number]     • Percocet [Oxycodone-Acetaminophen] Vomiting and Nausea     [id number]   • Vicodin [Hydrocodone-Acetaminophen] Vomiting and Nausea     [id number]=[id number]     SURGHX:   Past Surgical History:   Procedure Laterality Date   • HIP ARTHROPLASTY TOTAL Left 2/16/2016    Procedure: HIP ARTHROPLASTY TOTAL;  Surgeon: Addy SEXTON" "ANTHONY Chen;  Location: SURGERY [facility];  Service:    • TRIGGER FINGER RELEASE  11/17/2009    Performed by JOSE ANGEL SRIVASTAVA at SURGERY [facility]   • TRIGGER FINGER RELEASE  3/2009    left index and middle   • PB PART EXCIS PLANTAR FASCIA  2004    left   • THYROIDECTOMY  as child    goiter   • TONSILLECTOMY  as child   • US-NEEDLE CORE BX-BREAST PANEL      right     SOCHX:  reports that she has never smoked. She has never used smokeless tobacco. She reports current alcohol use. She reports that she does not use drugs.  FH: Reviewed with patient, not pertinent to this visit.     Review of Systems   Constitutional: Negative for chills and fever.   HENT: Positive for ear pain (pressure/fullness). Negative for congestion and ear discharge.    Respiratory: Negative for cough.    Neurological: Negative for headaches.      Objective:   /70 (BP Location: Left arm, Patient Position: Sitting, BP Cuff Size: Adult)   Pulse 74   Temp 37 °C (98.6 °F)   Resp 16   Ht 1.6 m (5' 3\")   Wt 86.6 kg (191 lb)   SpO2 98%   BMI 33.83 kg/m²   Physical Exam  Vitals and nursing note reviewed.   Constitutional:       General: She is not in acute distress.     Appearance: Normal appearance. She is well-developed. She is not ill-appearing or toxic-appearing.   HENT:      Head: Normocephalic and atraumatic.      Right Ear: Hearing and ear canal normal. A middle ear effusion is present. Tympanic membrane is bulging.      Left Ear: Hearing, tympanic membrane and ear canal normal.   Eyes:      Pupils: Pupils are equal, round, and reactive to light.   Cardiovascular:      Rate and Rhythm: Normal rate and regular rhythm.      Heart sounds: Normal heart sounds.   Pulmonary:      Effort: Pulmonary effort is normal.      Breath sounds: Normal breath sounds.   Musculoskeletal:      Comments: Normal movement in all 4 extremities   Skin:     General: Skin is warm and dry.   Neurological:      Mental Status: She is alert.      " Coordination: Coordination normal.   Psychiatric:         Mood and Affect: Mood normal.       Assessment/Plan:   Assessment    1. Non-recurrent acute suppurative otitis media of right ear without spontaneous rupture of tympanic membrane  - amoxicillin (AMOXIL) 875 MG tablet; Take 1 Tablet by mouth 2 times a day for 7 days.  Dispense: 14 Tablet; Refill: 0  Symptoms and presentation are consistent with right-sided ear infection and will treat accordingly with antibiotics.  Recommend patient continue with over-the-counter nasal spray and antihistamine per 's instructions.  Differential diagnosis, natural history, supportive care, and indications for immediate follow-up discussed.   Patient given instructions and understanding of medications and treatment.    If not improving in 3-5 days, F/U with PCP or return to  if symptoms worsen.    Patient agreeable to plan.      Please note that this dictation was created using voice recognition software. I have made every reasonable attempt to correct obvious errors, but I expect that there are errors of grammar and possibly content that I did not discover before finalizing the note.    Valentin Gonzalez PA-C

## 2022-07-05 RX ORDER — ATORVASTATIN CALCIUM 10 MG/1
10 TABLET, FILM COATED ORAL
Qty: 90 TABLET | Refills: 3 | Status: SHIPPED | OUTPATIENT
Start: 2022-07-05 | End: 2023-06-23

## 2022-07-19 DIAGNOSIS — I10 ESSENTIAL HYPERTENSION: ICD-10-CM

## 2022-07-19 RX ORDER — AMLODIPINE BESYLATE 5 MG/1
TABLET ORAL
Qty: 90 TABLET | Refills: 3 | Status: SHIPPED | OUTPATIENT
Start: 2022-07-19 | End: 2023-04-07 | Stop reason: SDUPTHER

## 2022-12-15 ENCOUNTER — HOSPITAL ENCOUNTER (OUTPATIENT)
Dept: LAB | Facility: MEDICAL CENTER | Age: 77
End: 2022-12-15
Attending: INTERNAL MEDICINE
Payer: MEDICARE

## 2022-12-15 DIAGNOSIS — E55.9 VITAMIN D DEFICIENCY: ICD-10-CM

## 2022-12-15 DIAGNOSIS — E66.9 OBESITY, CLASS II, BMI 35-39.9, ISOLATED: ICD-10-CM

## 2022-12-15 LAB
25(OH)D3 SERPL-MCNC: 20 NG/ML (ref 30–100)
CHOLEST SERPL-MCNC: 152 MG/DL (ref 100–199)
FASTING STATUS PATIENT QL REPORTED: NORMAL
HDLC SERPL-MCNC: 44 MG/DL
LDLC SERPL CALC-MCNC: 91 MG/DL
TRIGL SERPL-MCNC: 83 MG/DL (ref 0–149)

## 2022-12-15 PROCEDURE — 80061 LIPID PANEL: CPT

## 2022-12-15 PROCEDURE — 82306 VITAMIN D 25 HYDROXY: CPT | Mod: GA

## 2022-12-15 PROCEDURE — 36415 COLL VENOUS BLD VENIPUNCTURE: CPT

## 2022-12-19 ENCOUNTER — OFFICE VISIT (OUTPATIENT)
Dept: MEDICAL GROUP | Facility: LAB | Age: 77
End: 2022-12-19
Payer: MEDICARE

## 2022-12-19 VITALS
TEMPERATURE: 98.1 F | OXYGEN SATURATION: 95 % | WEIGHT: 195 LBS | BODY MASS INDEX: 33.29 KG/M2 | RESPIRATION RATE: 12 BRPM | SYSTOLIC BLOOD PRESSURE: 145 MMHG | HEIGHT: 64 IN | HEART RATE: 72 BPM | DIASTOLIC BLOOD PRESSURE: 75 MMHG

## 2022-12-19 DIAGNOSIS — E55.9 VITAMIN D DEFICIENCY: ICD-10-CM

## 2022-12-19 DIAGNOSIS — F41.9 ANXIETY: ICD-10-CM

## 2022-12-19 DIAGNOSIS — E78.5 DYSLIPIDEMIA: ICD-10-CM

## 2022-12-19 PROCEDURE — 99214 OFFICE O/P EST MOD 30 MIN: CPT | Performed by: INTERNAL MEDICINE

## 2022-12-19 RX ORDER — CLOTRIMAZOLE AND BETAMETHASONE DIPROPIONATE 10; .64 MG/G; MG/G
CREAM TOPICAL
COMMUNITY
Start: 2022-11-13

## 2022-12-19 ASSESSMENT — FIBROSIS 4 INDEX: FIB4 SCORE: 1.55

## 2022-12-19 NOTE — PROGRESS NOTES
CC: Shikha Swift is a 77 y.o. female is suffering from   Chief Complaint   Patient presents with    Lab Results         SUBJECTIVE:  1. Dyslipidemia  Lauryn is here for follow-up, has a history of dyslipidemia, patient continues on atorvastatin no significant side effects    2. Vitamin D deficiency  History of vitamin D deficiency recheck levels in 6 months    3. Anxiety  Anxiety associated with 51 year old son with stroke - 3 children 18, 16, 8.  States that has been is assisting in caring for the children also for helping to transport her son now is using a wheelchair.        Past social, family, history: , drake.   Social History     Tobacco Use    Smoking status: Never    Smokeless tobacco: Never   Substance Use Topics    Alcohol use: Yes     Alcohol/week: 0.0 oz     Comment: 1-2 per month    Drug use: No         MEDICATIONS:    Current Outpatient Medications:     clotrimazole-betamethasone (LOTRISONE) 1-0.05 % Cream, APPLY TOPICALLY TO THE AFFECTED AREA TWICE DAILY AS NEEDED, Disp: , Rfl:     amLODIPine (NORVASC) 5 MG Tab, TAKE 1 TABLET BY MOUTH EVERY DAY, Disp: 90 Tablet, Rfl: 3    atorvastatin (LIPITOR) 10 MG Tab, Take 1 Tablet by mouth every day., Disp: 90 Tablet, Rfl: 3    benazepril (LOTENSIN) 40 MG tablet, Take 1 Tablet by mouth every day., Disp: 90 Tablet, Rfl: 3    levothyroxine (SYNTHROID) 100 MCG Tab, TAKE 1 TABLET BY MOUTH EVERY MORNING ON AN EMPTY STOMACH, Disp: 90 Tablet, Rfl: 3    estradiol (ESTRACE) 0.1 MG/GM vaginal cream, Insert 1 g in vagina Every Sunday and Wednesday., Disp: , Rfl:     Ibuprofen 200 MG Cap, Take 1-2 Caps by mouth 1 time daily as needed., Disp: , Rfl:     PROGESTERONE, Apply 1 Application to affected area(s) every evening. Compounded cream [facility], Disp: , Rfl:     PROBLEMS:  Patient Active Problem List    Diagnosis Date Noted    Pharyngitis 12/27/2018    Vitamin D deficiency 09/13/2018    Status post total hip replacement, left 02/16/2016     "Atypical face pain     Essential hypertension 08/11/2015    Obesity, Class II, BMI 35-39.9, isolated 06/15/2015    Headache 03/01/2012    Dyslipidemia 05/22/2009    Impaired glucose tolerance 05/22/2009    Hypothyroid 05/22/2009       REVIEW OF SYSTEMS:  Gen.:  No Nausea, Vomiting, fever, Chills.  Heart: No chest pain.  Lungs:  No shortness of Breath.  Psychological: Mack unusual Anxiety depression     PHYSICAL EXAM   Constitutional: Alert, cooperative, not in acute distress.  Cardiovascular:  Rate Rhythm is regular without murmurs rubs clicks.     Thorax & Lungs: Clear to auscultation, no wheezing, rhonchi, or rales  HENT: Normocephalic, Atraumatic.  Eyes: PERRLA, EOMI, Conjunctiva normal.   Neck: Trachia is midline no swelling of the thyroid.   Neurologic: Alert & oriented x 3, cranial nerves II through XII are intact, Normal motor function, Normal sensory function, No focal deficits noted.   Psychiatric: Affect normal, Judgment normal, Mood normal.     VITAL SIGNS:BP (!) 145/75   Pulse 72   Temp 36.7 °C (98.1 °F) (Temporal)   Resp 12   Ht 1.626 m (5' 4\")   Wt 88.5 kg (195 lb)   SpO2 95%   BMI 33.47 kg/m²     Labs: Reviewed    Assessment:                                                     Plan:    1. Dyslipidemia  No change in medical therapy continue with atorvastatin recheck comprehensive metabolic panel CBC  - CBC WITH DIFFERENTIAL; Future  - Comp Metabolic Panel; Future    2. Vitamin D deficiency  Stable  - CBC WITH DIFFERENTIAL; Future  - Comp Metabolic Panel; Future    3. Anxiety  Ongoing anxiety associated with her son          "

## 2023-02-09 ENCOUNTER — HOSPITAL ENCOUNTER (OUTPATIENT)
Dept: RADIOLOGY | Facility: MEDICAL CENTER | Age: 78
End: 2023-02-09
Attending: OBSTETRICS & GYNECOLOGY
Payer: MEDICARE

## 2023-02-09 DIAGNOSIS — Z12.31 VISIT FOR SCREENING MAMMOGRAM: ICD-10-CM

## 2023-02-09 PROCEDURE — 77063 BREAST TOMOSYNTHESIS BI: CPT

## 2023-02-25 DIAGNOSIS — E03.9 HYPOTHYROIDISM, UNSPECIFIED TYPE: ICD-10-CM

## 2023-02-27 RX ORDER — LEVOTHYROXINE SODIUM 0.1 MG/1
TABLET ORAL
Qty: 90 TABLET | Refills: 3 | Status: SHIPPED | OUTPATIENT
Start: 2023-02-27 | End: 2024-02-23

## 2023-02-27 NOTE — TELEPHONE ENCOUNTER
Received request via: Pharmacy    Was the patient seen in the last year in this department? Yes  LOV : 12/19/2022   Does the patient have an active prescription (recently filled or refills available) for medication(s) requested? No    Does the patient have [facility] Plus and need 100 day supply (blood pressure, diabetes and cholesterol meds only)? Patient does not have SCP

## 2023-03-16 ENCOUNTER — OFFICE VISIT (OUTPATIENT)
Dept: URGENT CARE | Facility: CLINIC | Age: 78
End: 2023-03-16
Payer: MEDICARE

## 2023-03-16 VITALS
BODY MASS INDEX: 34.55 KG/M2 | TEMPERATURE: 98.1 F | DIASTOLIC BLOOD PRESSURE: 76 MMHG | HEART RATE: 71 BPM | RESPIRATION RATE: 16 BRPM | SYSTOLIC BLOOD PRESSURE: 112 MMHG | OXYGEN SATURATION: 96 % | HEIGHT: 63 IN | WEIGHT: 195 LBS

## 2023-03-16 DIAGNOSIS — J06.9 VIRAL URI WITH COUGH: ICD-10-CM

## 2023-03-16 PROCEDURE — 99213 OFFICE O/P EST LOW 20 MIN: CPT | Performed by: REGISTERED NURSE

## 2023-03-16 RX ORDER — BENZONATATE 100 MG/1
100 CAPSULE ORAL 3 TIMES DAILY PRN
Qty: 60 CAPSULE | Refills: 0 | Status: SHIPPED | OUTPATIENT
Start: 2023-03-16

## 2023-03-16 ASSESSMENT — ENCOUNTER SYMPTOMS
SHORTNESS OF BREATH: 0
SPUTUM PRODUCTION: 0
SINUS PAIN: 0
STRIDOR: 0
DIARRHEA: 0
PALPITATIONS: 0
NECK PAIN: 0
ABDOMINAL PAIN: 0
HEADACHES: 0
NAUSEA: 0
DIZZINESS: 0
SENSORY CHANGE: 0
COUGH: 1
VOMITING: 0
SORE THROAT: 0
FEVER: 0
EYE PAIN: 0
TINGLING: 0
WEAKNESS: 0
EYE DISCHARGE: 0

## 2023-03-16 ASSESSMENT — FIBROSIS 4 INDEX: FIB4 SCORE: 1.55

## 2023-03-16 NOTE — PROGRESS NOTES
Chief Complaint   Patient presents with    Cough     X3 days, dry cough        HPI:   Patient is a 77 y.o. female who is presenting for 2 to 3 days of dry nonproductive cough, also has felt slightly fatigued.  Was exposed to significant other who had similar symptoms about 10 days ago.  Has been using OTC cough and cold medicine which seems to help slightly.  Immunizations are current.    Patient Active Problem List    Diagnosis Date Noted    Pharyngitis 12/27/2018    Vitamin D deficiency 09/13/2018    Status post total hip replacement, left 02/16/2016    Atypical face pain     Essential hypertension 08/11/2015    Obesity, Class II, BMI 35-39.9, isolated 06/15/2015    Headache 03/01/2012    Dyslipidemia 05/22/2009    Impaired glucose tolerance 05/22/2009    Hypothyroid 05/22/2009       Allergies   Allergen Reactions    Hydrocodone Vomiting and Nausea     PPC=9126      Percocet [Oxycodone-Acetaminophen] Vomiting and Nausea     UVJ=5701    Vicodin [Hydrocodone-Acetaminophen] Vomiting and Nausea     PUC=3163        Current Outpatient Medications Ordered in Epic   Medication Sig Dispense Refill    benzonatate (TESSALON) 100 MG Cap Take 1 Capsule by mouth 3 times a day as needed for Cough. 60 Capsule 0    levothyroxine (SYNTHROID) 100 MCG Tab TAKE 1 TABLET BY MOUTH EVERY MORNING ON AN EMPTY STOMACH 90 Tablet 3    clotrimazole-betamethasone (LOTRISONE) 1-0.05 % Cream APPLY TOPICALLY TO THE AFFECTED AREA TWICE DAILY AS NEEDED      amLODIPine (NORVASC) 5 MG Tab TAKE 1 TABLET BY MOUTH EVERY DAY 90 Tablet 3    atorvastatin (LIPITOR) 10 MG Tab Take 1 Tablet by mouth every day. 90 Tablet 3    benazepril (LOTENSIN) 40 MG tablet Take 1 Tablet by mouth every day. 90 Tablet 3    estradiol (ESTRACE) 0.1 MG/GM vaginal cream Insert 1 g in vagina Every Sunday and Wednesday.      Ibuprofen 200 MG Cap Take 1-2 Caps by mouth 1 time daily as needed.      PROGESTERONE Apply 1 Application to affected area(s) every evening. Compounded cream  [facility]       No current [facility]-ordered facility-administered medications on file.       History Reviewed: No pertinent long history    Social History     Tobacco Use    Smoking status: Never    Smokeless tobacco: Never   Vaping Use    Vaping Use: Never used   Substance Use Topics    Alcohol use: Yes     Alcohol/week: 0.0 oz     Comment: 1-2 per month    Drug use: No       Review of Systems   Constitutional:  Positive for malaise/fatigue. Negative for fever.   HENT:  Negative for congestion, ear discharge, ear pain, hearing loss, sinus pain and sore throat.    Eyes:  Negative for pain and discharge.   Respiratory:  Positive for cough. Negative for sputum production, shortness of breath and stridor.    Cardiovascular:  Negative for chest pain, palpitations and leg swelling.   Gastrointestinal:  Negative for abdominal pain, diarrhea, nausea and vomiting.   Musculoskeletal:  Negative for neck pain.   Skin:  Negative for rash.   Neurological:  Negative for dizziness, tingling, sensory change, weakness and headaches.      Vitals:    03/16/23 1111   BP: 112/76   Pulse: 71   Resp: 16   Temp: 36.7 °C (98.1 °F)   SpO2: 96%       Physical Exam  Vitals and nursing note reviewed.   Constitutional:       General: She is not in acute distress.     Appearance: Normal appearance. She is well-developed. She is not ill-appearing, toxic-appearing or diaphoretic.   HENT:      Head: Normocephalic and atraumatic.      Right Ear: Tympanic membrane, ear canal and external ear normal.      Left Ear: Tympanic membrane, ear canal and external ear normal.      Nose: Nose normal. No congestion or rhinorrhea.      Mouth/Throat:      Mouth: Mucous membranes are moist.      Pharynx: No oropharyngeal exudate or posterior oropharyngeal erythema.   Eyes:      General:         Right eye: No discharge.         Left eye: No discharge.      Conjunctiva/sclera: Conjunctivae normal.   Cardiovascular:      Rate and Rhythm: Normal rate and regular  rhythm.      Pulses: Normal pulses.      Heart sounds: Normal heart sounds.   Pulmonary:      Effort: Pulmonary effort is normal. No respiratory distress.      Breath sounds: Normal breath sounds. No wheezing, rhonchi or rales.   Musculoskeletal:         General: No swelling or tenderness. Normal range of motion.      Cervical back: Normal range of motion and neck supple.      Right lower leg: No edema.      Left lower leg: No edema.   Lymphadenopathy:      Cervical: No cervical adenopathy.   Skin:     General: Skin is warm and dry.   Neurological:      General: No focal deficit present.      Mental Status: She is alert and oriented to person, place, and time.   Psychiatric:         Mood and Affect: Mood normal.         Behavior: Behavior normal.     Assessment/Plan:  1. Viral URI with cough  benzonatate (TESSALON) 100 MG Cap      Vital signs are stable, unremarkable exam, symptoms are consistent with a viral URI, no red flag signs or symptoms.  Vaccinations are current.  Does have confirmed exposure to individuals similar symptoms.  Testing declined. Recommend adequate hydration, rest, deep breathing and coughing, ambulation as tolerated, OTC heart safe medications.     Return to clinic or go to ED if symptoms worsen or persist. Indications for ED discussed at length. Patient/Parent/Guardian voices understanding. Follow-up with your primary care provider in 3-5 days. Red flag symptoms discussed. All side effects of medication discussed including allergic response, GI upset, tendon injury, rash, sedation etc.    Please note that this dictation was created using voice recognition software. I have made every reasonable attempt to correct obvious errors, but I expect that there are errors of grammar and possibly content that I did not discover before finalizing the note.

## 2023-03-27 ENCOUNTER — OFFICE VISIT (OUTPATIENT)
Dept: URGENT CARE | Facility: CLINIC | Age: 78
End: 2023-03-27
Payer: MEDICARE

## 2023-03-27 VITALS
WEIGHT: 195 LBS | SYSTOLIC BLOOD PRESSURE: 150 MMHG | DIASTOLIC BLOOD PRESSURE: 84 MMHG | TEMPERATURE: 97.9 F | HEART RATE: 74 BPM | RESPIRATION RATE: 16 BRPM | BODY MASS INDEX: 34.55 KG/M2 | HEIGHT: 63 IN | OXYGEN SATURATION: 98 %

## 2023-03-27 DIAGNOSIS — J20.9 ACUTE BRONCHITIS, UNSPECIFIED ORGANISM: ICD-10-CM

## 2023-03-27 DIAGNOSIS — R09.81 NASAL CONGESTION: ICD-10-CM

## 2023-03-27 PROCEDURE — 99214 OFFICE O/P EST MOD 30 MIN: CPT

## 2023-03-27 RX ORDER — FLUTICASONE PROPIONATE 50 MCG
2 SPRAY, SUSPENSION (ML) NASAL
Qty: 16 G | Refills: 1 | Status: SHIPPED | OUTPATIENT
Start: 2023-03-27

## 2023-03-27 RX ORDER — DOXYCYCLINE 100 MG/1
100 CAPSULE ORAL 2 TIMES DAILY
Qty: 10 CAPSULE | Refills: 0 | Status: SHIPPED | OUTPATIENT
Start: 2023-03-27 | End: 2023-04-01

## 2023-03-27 ASSESSMENT — FIBROSIS 4 INDEX: FIB4 SCORE: 1.55

## 2023-03-27 NOTE — PATIENT INSTRUCTIONS
Acute Bronchitis, Adult  Acute bronchitis is when air tubes (bronchi) in the lungs suddenly get swollen. The condition can make it hard to breathe. It can also cause these symptoms:  A cough.  Coughing up clear, yellow, or green mucus.  Wheezing.  Chest congestion.  Shortness of breath.  A fever.  Body aches.  Chills.  A sore throat.  Follow these instructions at home:    Medicines  Take over-the-counter and prescription medicines only as told by your doctor.  If you were prescribed an antibiotic medicine, take it as told by your doctor. Do not stop taking the antibiotic even if you start to feel better.  General instructions  Rest.  Drink enough fluids to keep your pee (urine) pale yellow.  Avoid smoking and secondhand smoke. If you smoke and you need help quitting, ask your doctor. Quitting will help your lungs heal faster.  Use an inhaler, cool mist vaporizer, or humidifier as told by your doctor.  Keep all follow-up visits as told by your doctor. This is important.  How is this prevented?  To lower your risk of getting this condition again:  Wash your hands often with soap and water. If you cannot use soap and water, use hand .  Avoid contact with people who have cold symptoms.  Try not to touch your hands to your mouth, nose, or eyes.  Make sure to get the flu shot every year.  Contact a doctor if:  Your symptoms do not get better in 2 weeks.  Get help right away if:  You cough up blood.  You have chest pain.  You have very bad shortness of breath.  You become dehydrated.  You faint (pass out) or keep feeling like you are going to pass out.  You keep throwing up (vomiting).  You have a very bad headache.  Your fever or chills gets worse.  This information is not intended to replace advice given to you by your health care provider. Make sure you discuss any questions you have with your health care provider.  Document Released: 06/05/2009 Document Revised: 11/30/2018 Document Reviewed:  06/07/2017  Elsevier Patient Education © 2020 [employer]

## 2023-03-27 NOTE — PROGRESS NOTES
"Chief Complaint   Patient presents with    Nasal Congestion     \" I do not feel like I am getting better since my visit on 3/16.\"        HISTORY OF PRESENT ILLNESS: Patient is a pleasant 77 y.o. female who presents to urgent care today increased cough since 03/16, given tessalon here in UC but no major relief.  No noted fevers.  None productive.  Positive nasal congestion.  No noted shortness of breath.    Patient Active Problem List    Diagnosis Date Noted    Pharyngitis 12/27/2018    Vitamin D deficiency 09/13/2018    Status post total hip replacement, left 02/16/2016    Atypical face pain     Essential hypertension 08/11/2015    Obesity, Class II, BMI 35-39.9, isolated 06/15/2015    Headache 03/01/2012    Dyslipidemia 05/22/2009    Impaired glucose tolerance 05/22/2009    Hypothyroid 05/22/2009       Allergies:Hydrocodone, Percocet [oxycodone-acetaminophen], and Vicodin [hydrocodone-acetaminophen]    Current Outpatient Medications Ordered in Epic   Medication Sig Dispense Refill    benzonatate (TESSALON) 100 MG Cap Take 1 Capsule by mouth 3 times a day as needed for Cough. 60 Capsule 0    levothyroxine (SYNTHROID) 100 MCG Tab TAKE 1 TABLET BY MOUTH EVERY MORNING ON AN EMPTY STOMACH 90 Tablet 3    clotrimazole-betamethasone (LOTRISONE) 1-0.05 % Cream APPLY TOPICALLY TO THE AFFECTED AREA TWICE DAILY AS NEEDED      amLODIPine (NORVASC) 5 MG Tab TAKE 1 TABLET BY MOUTH EVERY DAY 90 Tablet 3    atorvastatin (LIPITOR) 10 MG Tab Take 1 Tablet by mouth every day. 90 Tablet 3    benazepril (LOTENSIN) 40 MG tablet Take 1 Tablet by mouth every day. 90 Tablet 3    estradiol (ESTRACE) 0.1 MG/GM vaginal cream Insert 1 g in vagina Every Sunday and Wednesday.      Ibuprofen 200 MG Cap Take 1-2 Caps by mouth 1 time daily as needed.      PROGESTERONE Apply 1 Application to affected area(s) every evening. Compounded cream [facility]       No current [facility]-ordered facility-administered medications on file.       Past Medical " "History:   Diagnosis Date    Atypical face pain     Left sided onset mid-    Cold     Headache(784.0) 3/1/2012    Hypertension     Hypothyroid     Infectious disease        Social History     Tobacco Use    Smoking status: Never    Smokeless tobacco: Never   Vaping Use    Vaping Use: Never used   Substance Use Topics    Alcohol use: Yes     Alcohol/week: 0.0 oz     Comment: 1-2 per month    Drug use: No       Family Status   Relation Name Status    Mo   at age 78    Fa   at age 78    OTHER  (Not Specified)     Family History   Problem Relation Age of Onset    Diabetes Mother     Diabetes Other     Hypertension Other     Stroke Other     Cancer Other        ROS:  Review of Systems   Constitutional: Negative for fever, chills, weight loss, malaise, and fatigue.   HENT: Negative for ear pain, nosebleeds, congestion, sore throat and neck pain.    Eyes: Negative for vision changes.   Neuro: Negative for headache, sensory changes, weakness, seizure, LOC.   Cardiovascular: Negative for chest pain, palpitations, orthopnea and leg swelling.   Respiratory: Positive for cough, positive clear negative sputum production, shortness of breath and wheezing.   Gastrointestinal: Negative for abdominal pain, nausea, vomiting or diarrhea.   Genitourinary: Negative for dysuria, urgency and frequency.  Musculoskeletal: Negative for falls, neck pain, back pain, joint pain, myalgias.   Skin: Negative for rash, diaphoresis.     Exam:  BP (!) 150/84 (BP Location: Right arm, Patient Position: Sitting, BP Cuff Size: Adult)   Pulse 74   Temp 36.6 °C (97.9 °F) (Temporal)   Resp 16   Ht 1.6 m (5' 3\")   Wt 88.5 kg (195 lb)   SpO2 98%   General: well-nourished, well-developed female in NAD  Head: normocephalic, atraumatic  Eyes: PERRLA, no conjunctival injection, acuity grossly intact, lids normal.  Ears: normal shape and symmetry, no tenderness, no discharge. External canals are without any significant edema or " erythema. Tympanic membranes are without any inflammation, no effusion. Gross auditory acuity is intact.  Nose: symmetrical without tenderness, positive clear nasal discharge.  Mouth/Throat: reasonable hygiene, no erythema, exudates or tonsillar enlargement.  Neck: no masses, range of motion within normal limits, no tracheal deviation. No obvious thyroid enlargement.   Lymph: no cervical adenopathy. No supraclavicular adenopathy.   Neuro: alert and oriented. Cranial nerves 1-12 grossly intact. No sensory deficit.   Cardiovascular: regular rate and rhythm. No edema.  Pulmonary: no distress. Chest is symmetrical with respiration, no wheezes, crackles, or rhonchi.   Abdomen: soft, non-tender, no guarding, no hepatosplenomegaly.  Musculoskeletal: no clubbing, appropriate muscle tone, gait is stable.  Skin: warm, dry, intact, no clubbing, no cyanosis, no rashes.   Psych: appropriate mood, affect, judgement.         Assessment/Plan:  1. Nasal congestion  fluticasone (FLONASE) 50 MCG/ACT nasal spray      2. Acute bronchitis, unspecified organism  doxycycline (MONODOX) 100 MG capsule         This is a pleasant 77-year-old female who comes in today with complaints of an ongoing cough.  She was seen here at the urgent care over a week ago and treated with Tessalon Perles.  She states she has not had any relief at all from these.  She denies any shortness of breath.  The cough is described as dry and nonproductive.  No noted fevers.  On physical exam she has clear nasal drainage, her lung sounds are clear to auscultation.  No edema noted to her bilateral lower extremities.  Physical exam is otherwise within normal limits.  Due to the course and nature of her illness I did prescribe Flonase, doxycycline, these were sent to the pharmacy and reviewed with the patient.  She is agreeable to the plan of care at this time.  Differential diagnosis to consider includes bronchitis, pneumonia, sinusitis    Supportive care, differential  diagnoses, and indications for immediate follow-up discussed with patient.   Pathogenesis of diagnosis discussed including typical length and natural progression.   Instructed to return to clinic or nearest emergency department for any change in condition, further concerns, or worsening of symptoms.  Patient states understanding of the plan of care and discharge instructions.  Instructed to make an appointment, for follow up, with  primary care provider.        Please note that this dictation was created using voice recognition software. I have made every reasonable attempt to correct obvious errors, but I expect that there are errors of grammar and possibly content that I did not discover before finalizing the note.      Lauryn MIRAMONTES

## 2023-04-07 DIAGNOSIS — I10 ESSENTIAL HYPERTENSION: ICD-10-CM

## 2023-04-07 RX ORDER — AMLODIPINE BESYLATE 5 MG/1
5 TABLET ORAL
Qty: 90 TABLET | Refills: 3 | Status: SHIPPED | OUTPATIENT
Start: 2023-04-07

## 2023-04-07 RX ORDER — BENAZEPRIL HYDROCHLORIDE 40 MG/1
40 TABLET ORAL
Qty: 90 TABLET | Refills: 3 | Status: SHIPPED | OUTPATIENT
Start: 2023-04-07

## 2023-06-23 RX ORDER — ATORVASTATIN CALCIUM 10 MG/1
10 TABLET, FILM COATED ORAL
Qty: 90 TABLET | Refills: 3 | Status: SHIPPED | OUTPATIENT
Start: 2023-06-23

## 2023-06-23 NOTE — TELEPHONE ENCOUNTER
Received request via: Pharmacy    Was the patient seen in the last year in this department? Yes  12/19/22  Does the patient have an active prescription (recently filled or refills available) for medication(s) requested? No    Does the patient have [facility] Plus and need 100 day supply (blood pressure, diabetes and cholesterol meds only)? Medication is not for cholesterol, blood pressure or diabetes

## 2023-07-03 ENCOUNTER — HOSPITAL ENCOUNTER (EMERGENCY)
Facility: MEDICAL CENTER | Age: 78
End: 2023-07-03
Attending: EMERGENCY MEDICINE
Payer: MEDICARE

## 2023-07-03 VITALS
HEART RATE: 75 BPM | SYSTOLIC BLOOD PRESSURE: 158 MMHG | HEIGHT: 64 IN | WEIGHT: 196.87 LBS | TEMPERATURE: 98.2 F | RESPIRATION RATE: 20 BRPM | BODY MASS INDEX: 33.61 KG/M2 | OXYGEN SATURATION: 97 % | DIASTOLIC BLOOD PRESSURE: 87 MMHG

## 2023-07-03 DIAGNOSIS — N30.01 ACUTE CYSTITIS WITH HEMATURIA: ICD-10-CM

## 2023-07-03 LAB
APPEARANCE UR: ABNORMAL
BACTERIA #/AREA URNS HPF: ABNORMAL /HPF
BILIRUB UR QL STRIP.AUTO: NEGATIVE
COLOR UR: YELLOW
EPI CELLS #/AREA URNS HPF: ABNORMAL /HPF
GLUCOSE UR STRIP.AUTO-MCNC: NEGATIVE MG/DL
KETONES UR STRIP.AUTO-MCNC: ABNORMAL MG/DL
LEUKOCYTE ESTERASE UR QL STRIP.AUTO: ABNORMAL
MICRO URNS: ABNORMAL
NITRITE UR QL STRIP.AUTO: POSITIVE
PH UR STRIP.AUTO: 5.5 [PH] (ref 5–8)
PROT UR QL STRIP: 30 MG/DL
RBC # URNS HPF: ABNORMAL /HPF
RBC UR QL AUTO: ABNORMAL
SP GR UR STRIP.AUTO: >=1.03
WBC #/AREA URNS HPF: ABNORMAL /HPF

## 2023-07-03 PROCEDURE — 87186 SC STD MICRODIL/AGAR DIL: CPT

## 2023-07-03 PROCEDURE — 81001 URINALYSIS AUTO W/SCOPE: CPT

## 2023-07-03 PROCEDURE — A9270 NON-COVERED ITEM OR SERVICE: HCPCS | Performed by: EMERGENCY MEDICINE

## 2023-07-03 PROCEDURE — 87077 CULTURE AEROBIC IDENTIFY: CPT

## 2023-07-03 PROCEDURE — 87086 URINE CULTURE/COLONY COUNT: CPT

## 2023-07-03 PROCEDURE — 700102 HCHG RX REV CODE 250 W/ 637 OVERRIDE(OP): Performed by: EMERGENCY MEDICINE

## 2023-07-03 PROCEDURE — 99284 EMERGENCY DEPT VISIT MOD MDM: CPT

## 2023-07-03 RX ORDER — PHENAZOPYRIDINE HYDROCHLORIDE 200 MG/1
100 TABLET, FILM COATED ORAL ONCE
Status: COMPLETED | OUTPATIENT
Start: 2023-07-03 | End: 2023-07-03

## 2023-07-03 RX ORDER — CEPHALEXIN 500 MG/1
500 CAPSULE ORAL 3 TIMES DAILY
Qty: 21 CAPSULE | Refills: 0 | Status: ACTIVE | OUTPATIENT
Start: 2023-07-03 | End: 2023-07-04 | Stop reason: SDUPTHER

## 2023-07-03 RX ORDER — PHENAZOPYRIDINE HYDROCHLORIDE 100 MG/1
100 TABLET, FILM COATED ORAL 3 TIMES DAILY PRN
Qty: 6 TABLET | Refills: 0 | Status: SHIPPED | OUTPATIENT
Start: 2023-07-03 | End: 2023-07-04 | Stop reason: SDUPTHER

## 2023-07-03 RX ORDER — CEPHALEXIN 250 MG/1
500 CAPSULE ORAL ONCE
Status: COMPLETED | OUTPATIENT
Start: 2023-07-03 | End: 2023-07-03

## 2023-07-03 RX ADMIN — CEPHALEXIN 500 MG: 250 CAPSULE ORAL at 23:00

## 2023-07-03 RX ADMIN — PHENAZOPYRIDINE 100 MG: 200 TABLET ORAL at 22:48

## 2023-07-04 RX ORDER — CEPHALEXIN 500 MG/1
500 CAPSULE ORAL 3 TIMES DAILY
Qty: 21 CAPSULE | Refills: 0 | Status: ACTIVE | OUTPATIENT
Start: 2023-07-04 | End: 2023-07-11

## 2023-07-04 RX ORDER — PHENAZOPYRIDINE HYDROCHLORIDE 100 MG/1
100 TABLET, FILM COATED ORAL 3 TIMES DAILY PRN
Qty: 6 TABLET | Refills: 0 | Status: SHIPPED | OUTPATIENT
Start: 2023-07-04

## 2023-07-04 NOTE — ED TRIAGE NOTES
"Patient presents to the ER with the following complaints:    Chief Complaint   Patient presents with    Painful Urination     Patient states she has had painful and burning urination. Patient denies flank and pelvic pain.        BP (!) 187/79   Pulse (!) 101   Temp 36.9 °C (98.4 °F) (Temporal)   Resp 20   Ht 1.626 m (5' 4\")   Wt 89.3 kg (196 lb 13.9 oz)   SpO2 96%   BMI 33.79 kg/m²       "

## 2023-07-04 NOTE — ED PROVIDER NOTES
ED Provider Note    CHIEF COMPLAINT  Chief Complaint   Patient presents with    Painful Urination     Patient states she has had painful and burning urination. Patient denies flank and pelvic pain.        EXTERNAL RECORDS REVIEWED  Outpatient Notes outpatient urgent care note reviewed from March of this year.  She was seen for increased cough.    HPI/ROS  LIMITATION TO HISTORY   Select: : None  OUTSIDE HISTORIAN(S):  none    Shikha Swift is a 78 y.o. female who presents for acute onset of dysuria.  That she said this just started tonight just pain when she urinates.  She denies any back pain no fevers chills no nausea vomiting.  She has not had frequent UTIs her last one was maybe 15 years ago.    PAST MEDICAL HISTORY   has a past medical history of Atypical face pain, Cold (), Headache(784.0) (3/1/2012), Hypertension, Hypothyroid, and Infectious disease.    SURGICAL HISTORY   has a past surgical history that includes tonsillectomy (as child); thyroidectomy (as child); part excis plantar fascia (2004); trigger finger release (3/2009); trigger finger release (11/17/2009); us-needle core bx-breast panel; and hip arthroplasty total (Left, 2/16/2016).    FAMILY HISTORY  Family History   Problem Relation Age of Onset    Diabetes Mother     Diabetes Other     Hypertension Other     Stroke Other     Cancer Other        SOCIAL HISTORY  Social History     Tobacco Use    Smoking status: Never    Smokeless tobacco: Never   Vaping Use    Vaping Use: Never used   Substance and Sexual Activity    Alcohol use: Yes     Alcohol/week: 0.0 oz     Comment: 1-2 per month    Drug use: No    Sexual activity: Not Currently     Partners: Male       CURRENT MEDICATIONS  Home Medications    **Home medications have not yet been reviewed for this encounter**         ALLERGIES  Allergies   Allergen Reactions    Hydrocodone Vomiting and Nausea     VNN=1349      Percocet [Oxycodone-Acetaminophen] Vomiting and Nausea      "KFK=7626    Vicodin [Hydrocodone-Acetaminophen] Vomiting and Nausea     CVI=6507       PHYSICAL EXAM  VITAL SIGNS: BP (!) 187/79   Pulse (!) 101   Temp 36.9 °C (98.4 °F) (Temporal)   Resp 20   Ht 1.626 m (5' 4\")   Wt 89.3 kg (196 lb 13.9 oz)   SpO2 96%   BMI 33.79 kg/m²    Constitutional: Alert in no apparent distress. Well apearing  HENT: Normocephalic, Atraumatic, Bilateral external ears normal. Nose normal.   Eyes:  Conjunctiva normal, non-icteric.   Lungs: Non-labored respirations  Skin: Warm, Dry, No erythema, No rash.   Abdomen: Soft nontender nondistended.  No CVA tenderness  Neurologic: Alert, Grossly non-focal.   Psychiatric: Affect normal, Judgment normal, Mood normal, Appears appropriate and not intoxicated.         DIAGNOSTIC STUDIES / PROCEDURES      LABS  Results for orders placed or performed during the hospital encounter of 07/03/23   URINALYSIS,CULTURE IF INDICATED    Specimen: Urine, Clean Catch   Result Value Ref Range    Color Yellow     Character Hazy (A)     Specific Gravity >=1.030 <1.035    Ph 5.5 5.0 - 8.0    Glucose Negative Negative mg/dL    Ketones Trace (A) Negative mg/dL    Protein 30 (A) Negative mg/dL    Bilirubin Negative Negative    Nitrite Positive (A) Negative    Leukocyte Esterase Small (A) Negative    Occult Blood Large (A) Negative    Micro Urine Req Microscopic    URINE MICROSCOPIC (W/UA)   Result Value Ref Range    WBC 10-20 (A) /hpf    -150 (A) /hpf    Bacteria Few (A) None /hpf    Epithelial Cells Few Few /hpf   URINE CULTURE(NEW)    Specimen: Urine   Result Value Ref Range    Significant Indicator NEG     Source UR     Site -     Culture Result -            COURSE & MEDICAL DECISION MAKING    ED Observation Status? No; Patient does not meet criteria for ED Observation.     INITIAL ASSESSMENT, COURSE AND PLAN  Care Narrative: This is a 78-year-old female that presents with symptoms consistent with cystitis.  Urinalysis shows evidence of UTI and cystitis with " hematuria.  She has no fever no back pain no nausea vomiting concerning for acute pyelonephritis.  I do not think additional labs are indicated at this time.  She will be given antibiotics and discharge.  She is agreeable to this plan.      DISPOSITION AND DISCUSSIONS  I have discussed management of the patient with the following physicians and KOBY's:  none    Discussion of management with other [facility] or appropriate source(s): None     Escalation of care considered, and ultimately not performed:blood analysis    Barriers to care at this time, including but not limited to:  none .     Decision tools and prescription drugs considered including, but not limited to: Antibiotics   .     The patient will return for new or worsening symptoms and is stable at the time of discharge. Patient was given return precautions. Patient and/or family member verbalizes understanding and will comply.    DISPOSITION:  Patient will be discharged home in stable condition.    FOLLOW UP:  Presley Black D.O.  [address]  [facility] [id number] [phone]      As needed    [facility], Emergency Dept  [address]  [facility] [address] [phone]    Return for worsening pain, fevers, vomiting or other concerns.      OUTPATIENT MEDICATIONS:  New Prescriptions    CEPHALEXIN (KEFLEX) 500 MG CAP    Take 1 Capsule by mouth 3 times a day for 7 days.    PHENAZOPYRIDINE (PYRIDIUM) 100 MG TAB    Take 1 Tablet by mouth 3 times a day as needed for Mild Pain.         FINAL DIAGNOSIS  1. Acute cystitis with hematuria           Electronically signed by: Gunjan Ortega M.D., 7/3/2023 9:51 PM

## 2023-07-06 LAB
BACTERIA UR CULT: ABNORMAL
BACTERIA UR CULT: ABNORMAL
SIGNIFICANT IND 70042: ABNORMAL
SITE SITE: ABNORMAL
SOURCE SOURCE: ABNORMAL

## 2023-07-07 NOTE — ED NOTES
"ED Positive Culture Follow-up/Notification Note:    Date: 7/7/23     Patient seen in the ED on 7/3/2023 for acute onset dysuria.   1. Acute cystitis with hematuria       Discharge Medication List as of 7/3/2023 10:39 PM        START taking these medications    Details   cephALEXin (KEFLEX) 500 MG Cap Take 1 Capsule by mouth 3 times a day for 7 days., Disp-21 Capsule, R-0, Normal      phenazopyridine (PYRIDIUM) 100 MG Tab Take 1 Tablet by mouth 3 times a day as needed for Mild Pain., Disp-6 Tablet, R-0, Normal             Allergies: Hydrocodone, Percocet [oxycodone-acetaminophen], and Vicodin [hydrocodone-acetaminophen]     Vitals:    07/03/23 2132 07/03/23 2135 07/03/23 2236   BP:  (!) 187/79 (!) 158/87   Pulse: (!) 101  75   Resp: 20  20   Temp: 36.9 °C (98.4 °F)  36.8 °C (98.2 °F)   TempSrc: Temporal  Temporal   SpO2: 96%  97%   Weight: 89.3 kg (196 lb 13.9 oz)     Height: 1.626 m (5' 4\")         Final cultures:   Results       Procedure Component Value Units Date/Time    URINE CULTURE(NEW) [[id number]]  (Abnormal)  (Susceptibility) Collected: 07/03/23 2141    Order Status: Completed Specimen: Urine Updated: 07/06/23 0926     Significant Indicator POS     Source UR     Site -     Culture Result Usual skin porsha 10-50,000 cfu/mL      Escherichia coli  >100,000 cfu/mL      Narrative:      Indication for culture:->Patient WITHOUT an indwelling Pierce  catheter in place with new onset of Dysuria, Frequency,  Urgency, and/or Suprapubic pain    Susceptibility       Escherichia coli (1)       Antibiotic Interpretation Microscan   Method Status    Amikacin  [*]  Sensitive <=16 mcg/mL SHAWNEE Final    Ampicillin Resistant >16 mcg/mL SHAWNEE Final    Amoxicillin/CA  [*]  Resistant >16/8 mcg/mL SHAWNEE Final    Aztreonam  [*]  Sensitive <=4 mcg/mL SHAWNEE Final    Ceftolozane+Tazobactam  [*]  Sensitive <=2 mcg/mL SHAWNEE Final    Ceftriaxone Sensitive <=1 mcg/mL SHAWNEE Final    Ceftazidime  [*]  Sensitive <=1 mcg/mL SHAWNEE Final    Cefazolin Sensitive " 8 mcg/mL SHAWNEE Final     Breakpoints when Cefazolin is used for therapy of infections  other than uncomplicated UTIs due to Enterobacterales are as  follows:  SHAWNEE and Interpretation:  <=2 S  4 I  >=8 R         Ciprofloxacin Sensitive <=0.25 mcg/mL SHAWNEE Final    Cefepime Sensitive <=2 mcg/mL SHAWNEE Final    Cefuroxime Sensitive <=4 mcg/mL SHAWNEE Final    Ceftazidime+Avibactam  [*]  Sensitive <=4 mcg/mL SHAWNEE Final    Ampicillin/sulbactam Sensitive 8/4 mcg/mL SHAWNEE Final    Ertapenem  [*]  Sensitive <=0.5 mcg/mL SHAWNEE Final    Tobramycin Sensitive <=2 mcg/mL SHAWNEE Final    Nitrofurantoin Sensitive <=32 mcg/mL SHAWNEE Final    Gentamicin Sensitive <=2 mcg/mL SHAWNEE Final    Imipenem  [*]  Sensitive <=1 mcg/mL SHAWNEE Final    Levofloxacin Sensitive <=0.5 mcg/mL SHAWNEE Final    Meropenem  [*]  Sensitive <=1 mcg/mL SHAWNEE Final    Meropenem/Vaborbactam  [*]  Sensitive <=2 mcg/mL SHAWNEE Final    Minocycline Sensitive <=4 mcg/mL SHAWNEE Final    Pip/Tazobactam Sensitive <=8 mcg/mL SHANWEE Final    Trimeth/Sulfa Sensitive <=0.5/9.5 mcg/mL SHAWNEE Final    Tetracycline  [*]  Sensitive <=4 mcg/mL SHAWNEE Final    Tigecycline Sensitive <=2 mcg/mL SHAWNEE Final               [*]  Suppressed Antibiotic                   URINALYSIS,CULTURE IF INDICATED [[id number]]  (Abnormal) Collected: 07/03/23 2141    Order Status: Completed Specimen: Urine, Clean Catch Updated: 07/03/23 2213     Color Yellow     Character Hazy     Specific Gravity >=1.030     Ph 5.5     Glucose Negative mg/dL      Ketones Trace mg/dL      Protein 30 mg/dL      Bilirubin Negative     Nitrite Positive     Leukocyte Esterase Small     Occult Blood Large     Micro Urine Req Microscopic    Narrative:      Indication for culture:->Patient WITHOUT an indwelling Pierce  catheter in place with new onset of Dysuria, Frequency,  Urgency, and/or Suprapubic pain            Plan:   Appropriate antibiotic therapy prescribed. No changes required based upon culture result.      Maureen Funes, PharmD

## 2023-07-15 ENCOUNTER — HOSPITAL ENCOUNTER (OUTPATIENT)
Dept: LAB | Facility: MEDICAL CENTER | Age: 78
End: 2023-07-15
Attending: INTERNAL MEDICINE
Payer: MEDICARE

## 2023-07-15 DIAGNOSIS — E78.5 DYSLIPIDEMIA: ICD-10-CM

## 2023-07-15 DIAGNOSIS — E55.9 VITAMIN D DEFICIENCY: ICD-10-CM

## 2023-07-15 LAB
ALBUMIN SERPL BCP-MCNC: 4.3 G/DL (ref 3.2–4.9)
ALBUMIN/GLOB SERPL: 1.5 G/DL
ALP SERPL-CCNC: 90 U/L (ref 30–99)
ALT SERPL-CCNC: 50 U/L (ref 2–50)
ANION GAP SERPL CALC-SCNC: 11 MMOL/L (ref 7–16)
AST SERPL-CCNC: 34 U/L (ref 12–45)
BASOPHILS # BLD AUTO: 0.6 % (ref 0–1.8)
BASOPHILS # BLD: 0.04 K/UL (ref 0–0.12)
BILIRUB SERPL-MCNC: 0.5 MG/DL (ref 0.1–1.5)
BUN SERPL-MCNC: 19 MG/DL (ref 8–22)
CALCIUM ALBUM COR SERPL-MCNC: 9.9 MG/DL (ref 8.5–10.5)
CALCIUM SERPL-MCNC: 10.1 MG/DL (ref 8.4–10.2)
CHLORIDE SERPL-SCNC: 109 MMOL/L (ref 96–112)
CO2 SERPL-SCNC: 21 MMOL/L (ref 20–33)
CREAT SERPL-MCNC: 0.73 MG/DL (ref 0.5–1.4)
EOSINOPHIL # BLD AUTO: 0.16 K/UL (ref 0–0.51)
EOSINOPHIL NFR BLD: 2.6 % (ref 0–6.9)
ERYTHROCYTE [DISTWIDTH] IN BLOOD BY AUTOMATED COUNT: 41.8 FL (ref 35.9–50)
GFR SERPLBLD CREATININE-BSD FMLA CKD-EPI: 85 ML/MIN/1.73 M 2
GLOBULIN SER CALC-MCNC: 2.8 G/DL (ref 1.9–3.5)
GLUCOSE SERPL-MCNC: 109 MG/DL (ref 65–99)
HCT VFR BLD AUTO: 41.6 % (ref 37–47)
HGB BLD-MCNC: 13.9 G/DL (ref 12–16)
IMM GRANULOCYTES # BLD AUTO: 0.01 K/UL (ref 0–0.11)
IMM GRANULOCYTES NFR BLD AUTO: 0.2 % (ref 0–0.9)
LYMPHOCYTES # BLD AUTO: 1.31 K/UL (ref 1–4.8)
LYMPHOCYTES NFR BLD: 21.3 % (ref 22–41)
MCH RBC QN AUTO: 29.5 PG (ref 27–33)
MCHC RBC AUTO-ENTMCNC: 33.4 G/DL (ref 32.2–35.5)
MCV RBC AUTO: 88.3 FL (ref 81.4–97.8)
MONOCYTES # BLD AUTO: 0.67 K/UL (ref 0–0.85)
MONOCYTES NFR BLD AUTO: 10.9 % (ref 0–13.4)
NEUTROPHILS # BLD AUTO: 3.97 K/UL (ref 1.82–7.42)
NEUTROPHILS NFR BLD: 64.4 % (ref 44–72)
NRBC # BLD AUTO: 0 K/UL
NRBC BLD-RTO: 0 /100 WBC (ref 0–0.2)
PLATELET # BLD AUTO: 190 K/UL (ref 164–446)
PMV BLD AUTO: 9.8 FL (ref 9–12.9)
POTASSIUM SERPL-SCNC: 4 MMOL/L (ref 3.6–5.5)
PROT SERPL-MCNC: 7.1 G/DL (ref 6–8.2)
RBC # BLD AUTO: 4.71 M/UL (ref 4.2–5.4)
SODIUM SERPL-SCNC: 141 MMOL/L (ref 135–145)
WBC # BLD AUTO: 6.2 K/UL (ref 4.8–10.8)

## 2023-07-15 PROCEDURE — 85025 COMPLETE CBC W/AUTO DIFF WBC: CPT

## 2023-07-15 PROCEDURE — 36415 COLL VENOUS BLD VENIPUNCTURE: CPT

## 2023-07-15 PROCEDURE — 80053 COMPREHEN METABOLIC PANEL: CPT

## 2023-07-19 ENCOUNTER — OFFICE VISIT (OUTPATIENT)
Dept: MEDICAL GROUP | Facility: LAB | Age: 78
End: 2023-07-19
Payer: MEDICARE

## 2023-07-19 VITALS
SYSTOLIC BLOOD PRESSURE: 138 MMHG | WEIGHT: 190 LBS | OXYGEN SATURATION: 97 % | RESPIRATION RATE: 12 BRPM | DIASTOLIC BLOOD PRESSURE: 70 MMHG | HEART RATE: 75 BPM | BODY MASS INDEX: 33.66 KG/M2 | TEMPERATURE: 98.3 F | HEIGHT: 63 IN

## 2023-07-19 DIAGNOSIS — Z23 NEED FOR SHINGLES VACCINE: ICD-10-CM

## 2023-07-19 DIAGNOSIS — Z23 NEED FOR TDAP VACCINATION: ICD-10-CM

## 2023-07-19 DIAGNOSIS — G89.29 CHRONIC PAIN OF LEFT THUMB: ICD-10-CM

## 2023-07-19 DIAGNOSIS — M79.645 CHRONIC PAIN OF LEFT THUMB: ICD-10-CM

## 2023-07-19 PROCEDURE — 3078F DIAST BP <80 MM HG: CPT | Performed by: INTERNAL MEDICINE

## 2023-07-19 PROCEDURE — 3075F SYST BP GE 130 - 139MM HG: CPT | Performed by: INTERNAL MEDICINE

## 2023-07-19 PROCEDURE — 99213 OFFICE O/P EST LOW 20 MIN: CPT | Performed by: INTERNAL MEDICINE

## 2023-07-19 ASSESSMENT — FIBROSIS 4 INDEX: FIB4 SCORE: 1.92

## 2023-07-19 ASSESSMENT — PATIENT HEALTH QUESTIONNAIRE - PHQ9: CLINICAL INTERPRETATION OF PHQ2 SCORE: 0

## 2023-07-20 NOTE — PROGRESS NOTES
CC: Shikha Swift is a 78 y.o. female is suffering from   Chief Complaint   Patient presents with    Lab Results     7 months follow up         SUBJECTIVE:  1. Need for Tdap vaccination  Lauryn is here for follow-up, is in need of a Tdap vaccination orders written    2. Need for shingles vaccine  Patient is in need of a shingles vaccine prescription written    3. Chronic pain of left thumb  Referral written for chronic left thumb pain suspect osteoarthritis        Past social, family, history: , Ravinder  Social History     Tobacco Use    Smoking status: Never    Smokeless tobacco: Never   Vaping Use    Vaping Use: Never used   Substance Use Topics    Alcohol use: Yes     Alcohol/week: 0.0 oz     Comment: 1-2 per month    Drug use: No         MEDICATIONS:    Current Outpatient Medications:     NON SPECIFIED, Please vaccinate with Shingrix vaccine., Disp: 1 Each, Rfl: 1    phenazopyridine (PYRIDIUM) 100 MG Tab, Take 1 Tablet by mouth 3 times a day as needed for Mild Pain., Disp: 6 Tablet, Rfl: 0    atorvastatin (LIPITOR) 10 MG Tab, TAKE 1 TABLET BY MOUTH EVERY DAY, Disp: 90 Tablet, Rfl: 3    benazepril (LOTENSIN) 40 MG tablet, Take 1 Tablet by mouth every day., Disp: 90 Tablet, Rfl: 3    amLODIPine (NORVASC) 5 MG Tab, Take 1 Tablet by mouth every day., Disp: 90 Tablet, Rfl: 3    fluticasone (FLONASE) 50 MCG/ACT nasal spray, Administer 2 Sprays into affected nostril(S) at bedtime., Disp: 16 g, Rfl: 1    benzonatate (TESSALON) 100 MG Cap, Take 1 Capsule by mouth 3 times a day as needed for Cough., Disp: 60 Capsule, Rfl: 0    levothyroxine (SYNTHROID) 100 MCG Tab, TAKE 1 TABLET BY MOUTH EVERY MORNING ON AN EMPTY STOMACH, Disp: 90 Tablet, Rfl: 3    clotrimazole-betamethasone (LOTRISONE) 1-0.05 % Cream, APPLY TOPICALLY TO THE AFFECTED AREA TWICE DAILY AS NEEDED, Disp: , Rfl:     estradiol (ESTRACE) 0.1 MG/GM vaginal cream, Insert 1 g in vagina Every Sunday and Wednesday., Disp: , Rfl:     Ibuprofen 200 MG  "Cap, Take 1-2 Caps by mouth 1 time daily as needed., Disp: , Rfl:     PROGESTERONE, Apply 1 Application to affected area(s) every evening. Compounded cream [facility], Disp: , Rfl:     PROBLEMS:  Patient Active Problem List    Diagnosis Date Noted    Pharyngitis 12/27/2018    Vitamin D deficiency 09/13/2018    Status post total hip replacement, left 02/16/2016    Atypical face pain     Essential hypertension 08/11/2015    Obesity, Class II, BMI 35-39.9, isolated 06/15/2015    Headache 03/01/2012    Dyslipidemia 05/22/2009    Impaired glucose tolerance 05/22/2009    Hypothyroid 05/22/2009       REVIEW OF SYSTEMS:  Gen.:  No Nausea, Vomiting, fever, Chills.  Heart: No chest pain.  Lungs:  No shortness of Breath.  Psychological: Mack unusual Anxiety depression     PHYSICAL EXAM   Constitutional: Alert, cooperative, not in acute distress.  Cardiovascular:  Rate Rhythm is regular without murmurs rubs clicks.     Thorax & Lungs: Clear to auscultation, no wheezing, rhonchi, or rales  HENT: Normocephalic, Atraumatic.  Eyes: PERRLA, EOMI, Conjunctiva normal.   Neck: Trachia is midline no swelling of the thyroid.   Lymphatic: No lymphadenopathy noted.   Musculoskeletal: Pain base left thumb  Neurologic: Alert & oriented x 3, cranial nerves II through XII are intact, Normal motor function, Normal sensory function, No focal deficits noted.   Psychiatric: Affect normal, Judgment normal, Mood normal.     VITAL SIGNS:/70   Pulse 75   Temp 36.8 °C (98.3 °F) (Temporal)   Resp 12   Ht 1.6 m (5' 3\")   Wt 86.2 kg (190 lb)   SpO2 97%   BMI 33.66 kg/m²     Labs: Reviewed    Assessment:                                                     Plan:    1. Need for Tdap vaccination  Vaccination given  - Tdap =>8yo IM    2. Need for shingles vaccine  Prescription written  - NON SPECIFIED; Please vaccinate with Shingrix vaccine.  Dispense: 1 Each; Refill: 1    3. Chronic pain of left thumb  Referral written  - Referral to " Orthopedics

## 2023-12-13 DIAGNOSIS — E55.9 VITAMIN D DEFICIENCY: ICD-10-CM

## 2023-12-13 DIAGNOSIS — E78.5 DYSLIPIDEMIA: ICD-10-CM

## 2023-12-13 DIAGNOSIS — E03.8 OTHER SPECIFIED HYPOTHYROIDISM: ICD-10-CM

## 2024-02-03 ENCOUNTER — APPOINTMENT (OUTPATIENT)
Dept: LAB | Facility: MEDICAL CENTER | Age: 79
End: 2024-02-03
Payer: MEDICARE

## 2024-02-12 ENCOUNTER — APPOINTMENT (OUTPATIENT)
Dept: RADIOLOGY | Facility: MEDICAL CENTER | Age: 79
End: 2024-02-12
Attending: INTERNAL MEDICINE
Payer: MEDICARE

## 2024-02-23 DIAGNOSIS — E03.9 HYPOTHYROIDISM, UNSPECIFIED TYPE: ICD-10-CM

## 2024-02-23 RX ORDER — LEVOTHYROXINE SODIUM 0.1 MG/1
TABLET ORAL
Qty: 90 TABLET | Refills: 3 | Status: SHIPPED | OUTPATIENT
Start: 2024-02-23

## 2024-02-23 NOTE — TELEPHONE ENCOUNTER
Received request via: Pharmacy    Was the patient seen in the last year in this department? Yes LOV: 7/19/23    Does the patient have an active prescription (recently filled or refills available) for medication(s) requested? No    [facility]    Does the patient have longterm Plus and need 100 day supply (blood pressure, diabetes and cholesterol meds only)? Patient does not have SCP

## 2024-02-24 ENCOUNTER — HOSPITAL ENCOUNTER (OUTPATIENT)
Dept: LAB | Facility: MEDICAL CENTER | Age: 79
End: 2024-02-24
Attending: INTERNAL MEDICINE
Payer: MEDICARE

## 2024-02-24 DIAGNOSIS — E55.9 VITAMIN D DEFICIENCY: ICD-10-CM

## 2024-02-24 DIAGNOSIS — E78.5 DYSLIPIDEMIA: ICD-10-CM

## 2024-02-24 DIAGNOSIS — E03.8 OTHER SPECIFIED HYPOTHYROIDISM: ICD-10-CM

## 2024-02-24 LAB
25(OH)D3 SERPL-MCNC: 22 NG/ML (ref 30–100)
ALBUMIN SERPL BCP-MCNC: 4.2 G/DL (ref 3.2–4.9)
ALBUMIN/GLOB SERPL: 1.3 G/DL
ALP SERPL-CCNC: 90 U/L (ref 30–99)
ALT SERPL-CCNC: 49 U/L (ref 2–50)
ANION GAP SERPL CALC-SCNC: 13 MMOL/L (ref 7–16)
AST SERPL-CCNC: 30 U/L (ref 12–45)
BASOPHILS # BLD AUTO: 0.7 % (ref 0–1.8)
BASOPHILS # BLD: 0.04 K/UL (ref 0–0.12)
BILIRUB SERPL-MCNC: 0.5 MG/DL (ref 0.1–1.5)
BUN SERPL-MCNC: 19 MG/DL (ref 8–22)
CALCIUM ALBUM COR SERPL-MCNC: 9.4 MG/DL (ref 8.5–10.5)
CALCIUM SERPL-MCNC: 9.6 MG/DL (ref 8.4–10.2)
CHLORIDE SERPL-SCNC: 107 MMOL/L (ref 96–112)
CHOLEST SERPL-MCNC: 161 MG/DL (ref 100–199)
CO2 SERPL-SCNC: 19 MMOL/L (ref 20–33)
CREAT SERPL-MCNC: 0.69 MG/DL (ref 0.5–1.4)
EOSINOPHIL # BLD AUTO: 0.26 K/UL (ref 0–0.51)
EOSINOPHIL NFR BLD: 4.8 % (ref 0–6.9)
ERYTHROCYTE [DISTWIDTH] IN BLOOD BY AUTOMATED COUNT: 42.6 FL (ref 35.9–50)
FASTING STATUS PATIENT QL REPORTED: NORMAL
GFR SERPLBLD CREATININE-BSD FMLA CKD-EPI: 89 ML/MIN/1.73 M 2
GLOBULIN SER CALC-MCNC: 3.2 G/DL (ref 1.9–3.5)
GLUCOSE SERPL-MCNC: 119 MG/DL (ref 65–99)
HCT VFR BLD AUTO: 42.2 % (ref 37–47)
HDLC SERPL-MCNC: 51 MG/DL
HGB BLD-MCNC: 14.3 G/DL (ref 12–16)
IMM GRANULOCYTES # BLD AUTO: 0.01 K/UL (ref 0–0.11)
IMM GRANULOCYTES NFR BLD AUTO: 0.2 % (ref 0–0.9)
LDLC SERPL CALC-MCNC: 91 MG/DL
LYMPHOCYTES # BLD AUTO: 1.34 K/UL (ref 1–4.8)
LYMPHOCYTES NFR BLD: 24.8 % (ref 22–41)
MCH RBC QN AUTO: 30 PG (ref 27–33)
MCHC RBC AUTO-ENTMCNC: 33.9 G/DL (ref 32.2–35.5)
MCV RBC AUTO: 88.5 FL (ref 81.4–97.8)
MONOCYTES # BLD AUTO: 0.6 K/UL (ref 0–0.85)
MONOCYTES NFR BLD AUTO: 11.1 % (ref 0–13.4)
NEUTROPHILS # BLD AUTO: 3.15 K/UL (ref 1.82–7.42)
NEUTROPHILS NFR BLD: 58.4 % (ref 44–72)
NRBC # BLD AUTO: 0 K/UL
NRBC BLD-RTO: 0 /100 WBC (ref 0–0.2)
PLATELET # BLD AUTO: 195 K/UL (ref 164–446)
PMV BLD AUTO: 9.7 FL (ref 9–12.9)
POTASSIUM SERPL-SCNC: 4.2 MMOL/L (ref 3.6–5.5)
PROT SERPL-MCNC: 7.4 G/DL (ref 6–8.2)
RBC # BLD AUTO: 4.77 M/UL (ref 4.2–5.4)
SODIUM SERPL-SCNC: 139 MMOL/L (ref 135–145)
T4 FREE SERPL-MCNC: 1.37 NG/DL (ref 0.93–1.7)
TRIGL SERPL-MCNC: 96 MG/DL (ref 0–149)
TSH SERPL DL<=0.005 MIU/L-ACNC: 1.39 UIU/ML (ref 0.38–5.33)
WBC # BLD AUTO: 5.4 K/UL (ref 4.8–10.8)

## 2024-02-24 PROCEDURE — 84443 ASSAY THYROID STIM HORMONE: CPT

## 2024-02-24 PROCEDURE — 80061 LIPID PANEL: CPT

## 2024-02-24 PROCEDURE — 84439 ASSAY OF FREE THYROXINE: CPT

## 2024-02-24 PROCEDURE — 82306 VITAMIN D 25 HYDROXY: CPT

## 2024-02-24 PROCEDURE — 85025 COMPLETE CBC W/AUTO DIFF WBC: CPT

## 2024-02-24 PROCEDURE — 80053 COMPREHEN METABOLIC PANEL: CPT

## 2024-02-24 PROCEDURE — 36415 COLL VENOUS BLD VENIPUNCTURE: CPT

## 2024-02-28 ENCOUNTER — OFFICE VISIT (OUTPATIENT)
Dept: MEDICAL GROUP | Facility: LAB | Age: 79
End: 2024-02-28
Payer: MEDICARE

## 2024-02-28 VITALS
SYSTOLIC BLOOD PRESSURE: 118 MMHG | WEIGHT: 198 LBS | DIASTOLIC BLOOD PRESSURE: 76 MMHG | HEIGHT: 64 IN | HEART RATE: 76 BPM | BODY MASS INDEX: 33.8 KG/M2 | TEMPERATURE: 98.7 F | OXYGEN SATURATION: 96 % | RESPIRATION RATE: 16 BRPM

## 2024-02-28 DIAGNOSIS — Z23 NEED FOR SHINGLES VACCINE: ICD-10-CM

## 2024-02-28 DIAGNOSIS — R73.02 IGT (IMPAIRED GLUCOSE TOLERANCE): ICD-10-CM

## 2024-02-28 DIAGNOSIS — E55.9 VITAMIN D DEFICIENCY: ICD-10-CM

## 2024-02-28 PROCEDURE — 99214 OFFICE O/P EST MOD 30 MIN: CPT | Performed by: INTERNAL MEDICINE

## 2024-02-28 PROCEDURE — 3078F DIAST BP <80 MM HG: CPT | Performed by: INTERNAL MEDICINE

## 2024-02-28 PROCEDURE — 3074F SYST BP LT 130 MM HG: CPT | Performed by: INTERNAL MEDICINE

## 2024-02-28 ASSESSMENT — FIBROSIS 4 INDEX: FIB4 SCORE: 1.67

## 2024-02-28 NOTE — PROGRESS NOTES
CC: Shikha Swift is a 78 y.o. female is suffering from   Chief Complaint   Patient presents with    Follow-Up         SUBJECTIVE:  1. Vitamin D deficiency  Patient is here for follow-up has a history of vitamin D deficiency, we have discussed that she needs to be on at least 2000 international units each day    2. IGT (impaired glucose tolerance)  History of impaired glucose tolerance, continue to monitor recheck labs in 6 months encourage patient work on diet exercise    3. Need for shingles vaccine  Prescription written for Shingrix vaccination        Past social, family, history: , Ravinder  Social History     Tobacco Use    Smoking status: Never    Smokeless tobacco: Never   Vaping Use    Vaping Use: Never used   Substance Use Topics    Alcohol use: Yes     Alcohol/week: 0.0 oz     Comment: 1-2 per month    Drug use: No         MEDICATIONS:    Current Outpatient Medications:     NON SPECIFIED, Please vaccinate with Shingrix vaccine, Disp: 1 Each, Rfl: 1    levothyroxine (SYNTHROID) 100 MCG Tab, TAKE 1 TABLET BY MOUTH EVERY MORNING ON AN EMPTY STOMACH, Disp: 90 Tablet, Rfl: 3    NON SPECIFIED, Please vaccinate with Shingrix vaccine., Disp: 1 Each, Rfl: 1    phenazopyridine (PYRIDIUM) 100 MG Tab, Take 1 Tablet by mouth 3 times a day as needed for Mild Pain., Disp: 6 Tablet, Rfl: 0    atorvastatin (LIPITOR) 10 MG Tab, TAKE 1 TABLET BY MOUTH EVERY DAY, Disp: 90 Tablet, Rfl: 3    benazepril (LOTENSIN) 40 MG tablet, Take 1 Tablet by mouth every day., Disp: 90 Tablet, Rfl: 3    amLODIPine (NORVASC) 5 MG Tab, Take 1 Tablet by mouth every day., Disp: 90 Tablet, Rfl: 3    fluticasone (FLONASE) 50 MCG/ACT nasal spray, Administer 2 Sprays into affected nostril(S) at bedtime., Disp: 16 g, Rfl: 1    benzonatate (TESSALON) 100 MG Cap, Take 1 Capsule by mouth 3 times a day as needed for Cough., Disp: 60 Capsule, Rfl: 0    clotrimazole-betamethasone (LOTRISONE) 1-0.05 % Cream, APPLY TOPICALLY TO THE AFFECTED  "AREA TWICE DAILY AS NEEDED, Disp: , Rfl:     estradiol (ESTRACE) 0.1 MG/GM vaginal cream, Insert 1 g in vagina Every Sunday and Wednesday., Disp: , Rfl:     Ibuprofen 200 MG Cap, Take 1-2 Caps by mouth 1 time daily as needed., Disp: , Rfl:     PROGESTERONE, Apply 1 Application to affected area(s) every evening. Compounded cream [facility], Disp: , Rfl:     PROBLEMS:  Patient Active Problem List    Diagnosis Date Noted    Pharyngitis 12/27/2018    Vitamin D deficiency 09/13/2018    Status post total hip replacement, left 02/16/2016    Atypical face pain     Essential hypertension 08/11/2015    Obesity, Class II, BMI 35-39.9, isolated 06/15/2015    Headache 03/01/2012    Dyslipidemia 05/22/2009    Impaired glucose tolerance 05/22/2009    Hypothyroid 05/22/2009       REVIEW OF SYSTEMS:  Gen.:  No Nausea, Vomiting, fever, Chills.  Heart: No chest pain.  Lungs:  No shortness of Breath.  Psychological: Mack unusual Anxiety depression     PHYSICAL EXAM   Constitutional: Alert, cooperative, not in acute distress.  Cardiovascular:  Rate Rhythm is regular without murmurs rubs clicks.     Thorax & Lungs: Clear to auscultation, no wheezing, rhonchi, or rales  HENT: Normocephalic, Atraumatic.  Eyes: PERRLA, EOMI, Conjunctiva normal.   Neck: Trachia is midline no swelling of the thyroid.   Lymphatic: No lymphadenopathy noted.   Neurologic: Alert & oriented x 3, cranial nerves II through XII are intact, Normal motor function, Normal sensory function, No focal deficits noted.   Psychiatric: Affect normal, Judgment normal, Mood normal.     VITAL SIGNS:/76 (BP Location: Left arm, Patient Position: Sitting, BP Cuff Size: Large adult)   Pulse 76   Temp 37.1 °C (98.7 °F)   Resp 16   Ht 1.626 m (5' 4\")   Wt 89.8 kg (198 lb)   SpO2 96%   BMI 33.99 kg/m²     Labs: Reviewed    Assessment:                                                     Plan:    1. Vitamin D deficiency  Vitamin D deficiency start on 2000 international " units of vitamin D3  - VITAMIN D,25 HYDROXY (DEFICIENCY); Future    2. IGT (impaired glucose tolerance)  Impaired glucose tolerance recheck hemoglobin A1c basic metabolic panel in 6 months work on diet exercise  - HEMOGLOBIN A1C; Future  - Basic Metabolic Panel; Future    3. Need for shingles vaccine  Prescription written for Shingrix vaccination  - NON SPECIFIED; Please vaccinate with Shingrix vaccine  Dispense: 1 Each; Refill: 1

## 2024-03-20 ENCOUNTER — HOSPITAL ENCOUNTER (OUTPATIENT)
Dept: RADIOLOGY | Facility: MEDICAL CENTER | Age: 79
End: 2024-03-20
Attending: INTERNAL MEDICINE
Payer: MEDICARE

## 2024-03-20 DIAGNOSIS — Z12.31 ENCOUNTER FOR MAMMOGRAM TO ESTABLISH BASELINE MAMMOGRAM: ICD-10-CM

## 2024-03-20 PROCEDURE — 77067 SCR MAMMO BI INCL CAD: CPT

## 2024-03-31 DIAGNOSIS — I10 ESSENTIAL HYPERTENSION: ICD-10-CM

## 2024-04-01 NOTE — TELEPHONE ENCOUNTER
Received request via: Pharmacy    Was the patient seen in the last year in this department? Yes    Does the patient have an active prescription (recently filled or refills available) for medication(s) requested? No    [facility]    Does the patient have [facility] Plus and need 100 day supply (blood pressure, diabetes and cholesterol meds only)? Patient does not have SCP

## 2024-04-03 RX ORDER — BENAZEPRIL HYDROCHLORIDE 40 MG/1
40 TABLET ORAL
Qty: 90 TABLET | Refills: 0 | Status: SHIPPED | OUTPATIENT
Start: 2024-04-03

## 2024-06-11 RX ORDER — ATORVASTATIN CALCIUM 10 MG/1
10 TABLET, FILM COATED ORAL
Qty: 90 TABLET | Refills: 3 | Status: SHIPPED | OUTPATIENT
Start: 2024-06-11

## 2024-06-28 ENCOUNTER — OFFICE VISIT (OUTPATIENT)
Dept: MEDICAL GROUP | Facility: LAB | Age: 79
End: 2024-06-28
Payer: MEDICARE

## 2024-06-28 VITALS
HEART RATE: 87 BPM | SYSTOLIC BLOOD PRESSURE: 118 MMHG | DIASTOLIC BLOOD PRESSURE: 76 MMHG | BODY MASS INDEX: 34.91 KG/M2 | RESPIRATION RATE: 18 BRPM | HEIGHT: 63 IN | WEIGHT: 197 LBS | TEMPERATURE: 97.4 F | OXYGEN SATURATION: 97 %

## 2024-06-28 DIAGNOSIS — R47.02 DYSPHASIA: ICD-10-CM

## 2024-06-28 DIAGNOSIS — Z86.39 HISTORY OF GOITER: ICD-10-CM

## 2024-06-28 ASSESSMENT — FIBROSIS 4 INDEX: FIB4 SCORE: 1.692307692307692308

## 2024-06-28 NOTE — PROGRESS NOTES
CC: Sihkha Swift is a 79 y.o. female is suffering from   Chief Complaint   Patient presents with    Follow-Up         SUBJECTIVE:  1. History of goiter  Lauryn is here for follow-up, states that as a child she had problems with a goiter which was removed when she lived in [place].  Patient is now having problems with damage to swallow query possible stricturing associate with her previous surgery    2. Dysphasia  History of dysphagia which is intermittent CT of the neck ordered    History of Present Illness      Past social, family, history:   Social History     Tobacco Use    Smoking status: Never    Smokeless tobacco: Never   Vaping Use    Vaping status: Never Used   Substance Use Topics    Alcohol use: Yes     Alcohol/week: 0.0 oz     Comment: 1-2 per month    Drug use: No         MEDICATIONS:    Current Outpatient Medications:     atorvastatin (LIPITOR) 10 MG Tab, TAKE 1 TABLET BY MOUTH EVERY DAY, Disp: 90 Tablet, Rfl: 3    benazepril (LOTENSIN) 40 MG tablet, TAKE 1 TABLET BY MOUTH EVERY DAY, Disp: 90 Tablet, Rfl: 0    NON SPECIFIED, Please vaccinate with Shingrix vaccine, Disp: 1 Each, Rfl: 1    levothyroxine (SYNTHROID) 100 MCG Tab, TAKE 1 TABLET BY MOUTH EVERY MORNING ON AN EMPTY STOMACH, Disp: 90 Tablet, Rfl: 3    NON SPECIFIED, Please vaccinate with Shingrix vaccine., Disp: 1 Each, Rfl: 1    phenazopyridine (PYRIDIUM) 100 MG Tab, Take 1 Tablet by mouth 3 times a day as needed for Mild Pain., Disp: 6 Tablet, Rfl: 0    amLODIPine (NORVASC) 5 MG Tab, Take 1 Tablet by mouth every day., Disp: 90 Tablet, Rfl: 3    fluticasone (FLONASE) 50 MCG/ACT nasal spray, Administer 2 Sprays into affected nostril(S) at bedtime., Disp: 16 g, Rfl: 1    benzonatate (TESSALON) 100 MG Cap, Take 1 Capsule by mouth 3 times a day as needed for Cough., Disp: 60 Capsule, Rfl: 0    clotrimazole-betamethasone (LOTRISONE) 1-0.05 % Cream, APPLY TOPICALLY TO THE AFFECTED AREA TWICE DAILY AS NEEDED, Disp: , Rfl:      "estradiol (ESTRACE) 0.1 MG/GM vaginal cream, Insert 1 g in vagina Every Sunday and Wednesday., Disp: , Rfl:     Ibuprofen 200 MG Cap, Take 1-2 Caps by mouth 1 time daily as needed., Disp: , Rfl:     PROGESTERONE, Apply 1 Application to affected area(s) every evening. Compounded cream [facility], Disp: , Rfl:     PROBLEMS:  Patient Active Problem List    Diagnosis Date Noted    Pharyngitis 12/27/2018    Vitamin D deficiency 09/13/2018    Status post total hip replacement, left 02/16/2016    Atypical face pain     Essential hypertension 08/11/2015    Obesity, Class II, BMI 35-39.9, isolated 06/15/2015    Headache 03/01/2012    Dyslipidemia 05/22/2009    Impaired glucose tolerance 05/22/2009    Hypothyroid 05/22/2009       REVIEW OF SYSTEMS:  Gen.:  No Nausea, Vomiting, fever, Chills.  Heart: No chest pain.  Lungs:  No shortness of Breath.  Psychological: Mack unusual Anxiety depression     PHYSICAL EXAM   Physical Exam       Constitutional: Alert, cooperative, not in acute distress.  Cardiovascular:  Rate Rhythm is regular without murmurs rubs clicks.     Thorax & Lungs: Clear to auscultation, no wheezing, rhonchi, or rales  HENT: Normocephalic, Atraumatic.  Eyes: PERRLA, EOMI, Conjunctiva normal.   Neck: Trachia is midline no swelling of the thyroid.   Lymphatic: No lymphadenopathy noted.   Neurologic: Alert & oriented x 3, cranial nerves II through XII are intact, Normal motor function, Normal sensory function, No focal deficits noted.   Psychiatric: Affect normal, Judgment normal, Mood normal.     VITAL SIGNS:/76 (BP Location: Left arm, Patient Position: Sitting, BP Cuff Size: Large adult)   Pulse 87   Temp 36.3 °C (97.4 °F)   Resp 18   Ht 1.6 m (5' 3\")   Wt 89.4 kg (197 lb)   SpO2 97%   BMI 34.90 kg/m²     Labs: Reviewed    Assessment:                                                     Plan:  Assessment & Plan       1. History of goiter  CT soft tissues of the neck ordered  - CT-SOFT TISSUE " NECK W/O; Future    2. Dysphasia  As listed above  - CT-SOFT TISSUE NECK W/O; Future

## 2024-06-30 DIAGNOSIS — I10 ESSENTIAL HYPERTENSION: ICD-10-CM

## 2024-07-01 RX ORDER — BENAZEPRIL HYDROCHLORIDE 40 MG/1
40 TABLET ORAL
Qty: 90 TABLET | Refills: 3 | Status: SHIPPED | OUTPATIENT
Start: 2024-07-01

## 2024-07-01 RX ORDER — AMLODIPINE BESYLATE 5 MG/1
5 TABLET ORAL
Qty: 90 TABLET | Refills: 3 | Status: SHIPPED | OUTPATIENT
Start: 2024-07-01

## 2024-07-18 ENCOUNTER — HOSPITAL ENCOUNTER (OUTPATIENT)
Dept: RADIOLOGY | Facility: MEDICAL CENTER | Age: 79
End: 2024-07-18
Attending: INTERNAL MEDICINE
Payer: MEDICARE

## 2024-07-18 DIAGNOSIS — Z86.39 HISTORY OF GOITER: ICD-10-CM

## 2024-07-18 DIAGNOSIS — R47.02 DYSPHASIA: ICD-10-CM

## 2024-07-18 PROCEDURE — 70490 CT SOFT TISSUE NECK W/O DYE: CPT

## 2024-10-22 ENCOUNTER — APPOINTMENT (OUTPATIENT)
Dept: MEDICAL GROUP | Facility: LAB | Age: 79
End: 2024-10-22
Payer: MEDICARE

## 2024-11-08 ENCOUNTER — HOSPITAL ENCOUNTER (OUTPATIENT)
Dept: LAB | Facility: MEDICAL CENTER | Age: 79
End: 2024-11-08
Attending: INTERNAL MEDICINE
Payer: MEDICARE

## 2024-11-08 DIAGNOSIS — R73.02 IGT (IMPAIRED GLUCOSE TOLERANCE): ICD-10-CM

## 2024-11-08 DIAGNOSIS — E55.9 VITAMIN D DEFICIENCY: ICD-10-CM

## 2024-11-08 LAB
25(OH)D3 SERPL-MCNC: 24 NG/ML (ref 30–100)
ANION GAP SERPL CALC-SCNC: 11 MMOL/L (ref 7–16)
BUN SERPL-MCNC: 17 MG/DL (ref 8–22)
CALCIUM SERPL-MCNC: 10 MG/DL (ref 8.4–10.2)
CHLORIDE SERPL-SCNC: 107 MMOL/L (ref 96–112)
CO2 SERPL-SCNC: 20 MMOL/L (ref 20–33)
CREAT SERPL-MCNC: 0.64 MG/DL (ref 0.5–1.4)
EST. AVERAGE GLUCOSE BLD GHB EST-MCNC: 123 MG/DL
FASTING STATUS PATIENT QL REPORTED: NORMAL
GFR SERPLBLD CREATININE-BSD FMLA CKD-EPI: 91 ML/MIN/1.73 M 2
GLUCOSE SERPL-MCNC: 123 MG/DL (ref 65–99)
HBA1C MFR BLD: 5.9 % (ref 4–5.6)
POTASSIUM SERPL-SCNC: 4 MMOL/L (ref 3.6–5.5)
SODIUM SERPL-SCNC: 138 MMOL/L (ref 135–145)

## 2024-11-08 PROCEDURE — 80048 BASIC METABOLIC PNL TOTAL CA: CPT

## 2024-11-08 PROCEDURE — 82306 VITAMIN D 25 HYDROXY: CPT

## 2024-11-08 PROCEDURE — 36415 COLL VENOUS BLD VENIPUNCTURE: CPT | Mod: GA

## 2024-11-08 PROCEDURE — 83036 HEMOGLOBIN GLYCOSYLATED A1C: CPT | Mod: GA

## 2024-11-13 ENCOUNTER — APPOINTMENT (OUTPATIENT)
Dept: MEDICAL GROUP | Facility: LAB | Age: 79
End: 2024-11-13
Payer: MEDICARE

## 2024-11-13 VITALS
BODY MASS INDEX: 33.98 KG/M2 | TEMPERATURE: 97.6 F | RESPIRATION RATE: 16 BRPM | OXYGEN SATURATION: 96 % | HEART RATE: 76 BPM | HEIGHT: 63 IN | DIASTOLIC BLOOD PRESSURE: 75 MMHG | SYSTOLIC BLOOD PRESSURE: 135 MMHG | WEIGHT: 191.8 LBS

## 2024-11-13 DIAGNOSIS — R73.02 IGT (IMPAIRED GLUCOSE TOLERANCE): ICD-10-CM

## 2024-11-13 DIAGNOSIS — E55.9 VITAMIN D DEFICIENCY: ICD-10-CM

## 2024-11-13 DIAGNOSIS — E78.5 DYSLIPIDEMIA: ICD-10-CM

## 2024-11-13 PROCEDURE — 3078F DIAST BP <80 MM HG: CPT | Performed by: INTERNAL MEDICINE

## 2024-11-13 PROCEDURE — 3074F SYST BP LT 130 MM HG: CPT | Performed by: INTERNAL MEDICINE

## 2024-11-13 PROCEDURE — 99214 OFFICE O/P EST MOD 30 MIN: CPT | Performed by: INTERNAL MEDICINE

## 2024-11-13 ASSESSMENT — FIBROSIS 4 INDEX: FIB4 SCORE: 1.692307692307692308

## 2024-11-13 ASSESSMENT — PATIENT HEALTH QUESTIONNAIRE - PHQ9: CLINICAL INTERPRETATION OF PHQ2 SCORE: 0

## 2024-11-13 NOTE — PROGRESS NOTES
CC: Shikha Swift is a 79 y.o. female is suffering from No chief complaint on file.        SUBJECTIVE:  1. IGT (impaired glucose tolerance)  Lauryn is here for follow-up has a history of impaired glucose tolerance, also has problems with obesity.  Patient states that she has lost weight recently    2. Vitamin D deficiency  Patient with history of vitamin D deficiency recheck levels    3. Dyslipidemia  Recheck lipid profile        Past social, family, history: , Ravinder  Social History     Tobacco Use    Smoking status: Never    Smokeless tobacco: Never   Vaping Use    Vaping status: Never Used   Substance Use Topics    Alcohol use: Yes     Alcohol/week: 0.0 oz     Comment: 1-2 per month    Drug use: No         MEDICATIONS:    Current Outpatient Medications:     benazepril (LOTENSIN) 40 MG tablet, TAKE 1 TABLET BY MOUTH EVERY DAY, Disp: 90 Tablet, Rfl: 3    amLODIPine (NORVASC) 5 MG Tab, TAKE 1 TABLET BY MOUTH EVERY DAY, Disp: 90 Tablet, Rfl: 3    atorvastatin (LIPITOR) 10 MG Tab, TAKE 1 TABLET BY MOUTH EVERY DAY, Disp: 90 Tablet, Rfl: 3    NON SPECIFIED, Please vaccinate with Shingrix vaccine, Disp: 1 Each, Rfl: 1    levothyroxine (SYNTHROID) 100 MCG Tab, TAKE 1 TABLET BY MOUTH EVERY MORNING ON AN EMPTY STOMACH, Disp: 90 Tablet, Rfl: 3    NON SPECIFIED, Please vaccinate with Shingrix vaccine., Disp: 1 Each, Rfl: 1    phenazopyridine (PYRIDIUM) 100 MG Tab, Take 1 Tablet by mouth 3 times a day as needed for Mild Pain., Disp: 6 Tablet, Rfl: 0    fluticasone (FLONASE) 50 MCG/ACT nasal spray, Administer 2 Sprays into affected nostril(S) at bedtime., Disp: 16 g, Rfl: 1    benzonatate (TESSALON) 100 MG Cap, Take 1 Capsule by mouth 3 times a day as needed for Cough., Disp: 60 Capsule, Rfl: 0    clotrimazole-betamethasone (LOTRISONE) 1-0.05 % Cream, APPLY TOPICALLY TO THE AFFECTED AREA TWICE DAILY AS NEEDED, Disp: , Rfl:     estradiol (ESTRACE) 0.1 MG/GM vaginal cream, Insert 1 g in vagina Every Sunday and  "Wednesday., Disp: , Rfl:     Ibuprofen 200 MG Cap, Take 1-2 Caps by mouth 1 time daily as needed., Disp: , Rfl:     PROGESTERONE, Apply 1 Application to affected area(s) every evening. Compounded cream [facility], Disp: , Rfl:     PROBLEMS:  Patient Active Problem List    Diagnosis Date Noted    Pharyngitis 12/27/2018    Vitamin D deficiency 09/13/2018    Status post total hip replacement, left 02/16/2016    Atypical face pain     Essential hypertension 08/11/2015    Obesity, Class II, BMI 35-39.9, isolated 06/15/2015    Headache 03/01/2012    Dyslipidemia 05/22/2009    Impaired glucose tolerance 05/22/2009    Hypothyroid 05/22/2009       REVIEW OF SYSTEMS:  Gen.:  No Nausea, Vomiting, fever, Chills.  Heart: No chest pain.  Lungs:  No shortness of Breath.  Psychological: Mack unusual Anxiety depression     PHYSICAL EXAM      Constitutional: Alert, cooperative, not in acute distress.  Cardiovascular:  Rate Rhythm is regular without murmurs rubs clicks.     Thorax & Lungs: Clear to auscultation, no wheezing, rhonchi, or rales  HENT: Normocephalic, Atraumatic.  Eyes: PERRLA, EOMI, Conjunctiva normal.   Neck: Trachia is midline no swelling of the thyroid.   Lymphatic: No lymphadenopathy noted.   Neurologic: Alert & oriented x 3, cranial nerves II through XII are intact, Normal motor function, Normal sensory function, No focal deficits noted.   Psychiatric: Affect normal, Judgment normal, Mood normal.     VITAL SIGNS:/75   Pulse 76   Temp 36.4 °C (97.6 °F) (Temporal)   Resp 16   Ht 1.6 m (5' 3\")   Wt 87 kg (191 lb 12.8 oz)   SpO2 96%   BMI 33.98 kg/m²     Labs: Reviewed    Assessment:                                                     Plan:     1. IGT (impaired glucose tolerance)  Recheck hemoglobin A1c comprehensive metabolic panel  - HEMOGLOBIN A1C; Future  - CBC WITH DIFFERENTIAL; Future  - Comp Metabolic Panel; Future    2. Vitamin D deficiency  Recheck vitamin D  - VITAMIN D,25 HYDROXY " (DEFICIENCY); Future  - CBC WITH DIFFERENTIAL; Future  - Comp Metabolic Panel; Future    3. Dyslipidemia  Recheck lipid profile  - CBC WITH DIFFERENTIAL; Future  - Comp Metabolic Panel; Future  - Lipid Profile; Future

## 2025-01-01 ENCOUNTER — OFFICE VISIT (OUTPATIENT)
Dept: URGENT CARE | Facility: CLINIC | Age: 80
End: 2025-01-01
Payer: MEDICARE

## 2025-01-01 VITALS
RESPIRATION RATE: 16 BRPM | OXYGEN SATURATION: 97 % | SYSTOLIC BLOOD PRESSURE: 126 MMHG | TEMPERATURE: 97.3 F | DIASTOLIC BLOOD PRESSURE: 80 MMHG | BODY MASS INDEX: 33.84 KG/M2 | HEIGHT: 63 IN | WEIGHT: 191 LBS | HEART RATE: 73 BPM

## 2025-01-01 DIAGNOSIS — H11.31 SUBCONJUNCTIVAL HEMORRHAGE OF RIGHT EYE: ICD-10-CM

## 2025-01-01 DIAGNOSIS — I10 ESSENTIAL HYPERTENSION: ICD-10-CM

## 2025-01-01 PROCEDURE — 3079F DIAST BP 80-89 MM HG: CPT | Performed by: NURSE PRACTITIONER

## 2025-01-01 PROCEDURE — 99214 OFFICE O/P EST MOD 30 MIN: CPT | Performed by: NURSE PRACTITIONER

## 2025-01-01 PROCEDURE — 3074F SYST BP LT 130 MM HG: CPT | Performed by: NURSE PRACTITIONER

## 2025-01-01 ASSESSMENT — VISUAL ACUITY: OU: 1

## 2025-01-01 ASSESSMENT — FIBROSIS 4 INDEX: FIB4 SCORE: 1.692307692307692308

## 2025-01-01 NOTE — PROGRESS NOTES
Shikha Swift is a 79 y.o. female who presents for Red Eye (This morning woke up with a Rt red eye, no pain.)    Accompanied by her  today   HPI  This is a new problem. Shikha Swift is a 79 y.o. patient who presents to urgent care with c/o: She woke up this morning with a red eye(right ).  She denies pain or discomfort.  She denies visual change.  She does not wear contact lenses.  She wears corrective lenses sometimes.  She denies itching, eye drainage.  Her  asked her what was wrong when he saw her this morning.  She felt normal when she woke up.  No other aggravating leaving factors.  She is never had anything like this before.    ROS See HPI    Allergies:       Allergies   Allergen Reactions    Hydrocodone Vomiting and Nausea     PYN=4241      Percocet [Oxycodone-Acetaminophen] Vomiting and Nausea     MQN=6200    Vicodin [Hydrocodone-Acetaminophen] Vomiting and Nausea     QUP=3005       PMSFS Hx:  Past Medical History:   Diagnosis Date    Atypical face pain     Left sided onset mid-2013    Cold     Headache(784.0) 3/1/2012    Hypertension     Hypothyroid     Infectious disease      Past Surgical History:   Procedure Laterality Date    HIP ARTHROPLASTY TOTAL Left 2/16/2016    Procedure: HIP ARTHROPLASTY TOTAL;  Surgeon: Addy Chen M.D.;  Location: SURGERY [facility];  Service:     TRIGGER FINGER RELEASE  11/17/2009    Performed by JOSE ANGEL SRIVASTAVA at SURGERY [facility]    TRIGGER FINGER RELEASE  3/2009    left index and middle    PB PART EXCIS PLANTAR FASCIA  2004    left    THYROIDECTOMY  as child    goiter    TONSILLECTOMY  as child    US-NEEDLE CORE BX-BREAST PANEL      right     Family History   Problem Relation Age of Onset    Diabetes Mother     Diabetes Other     Hypertension Other     Stroke Other     Cancer Other      Social History     Tobacco Use    Smoking status: Never    Smokeless tobacco: Never   Substance Use Topics    Alcohol use: Yes  "    Alcohol/week: 0.0 oz     Comment: 1-2 per month         Problems:   Patient Active Problem List   Diagnosis    Dyslipidemia    Impaired glucose tolerance    Hypothyroid    Headache    Obesity, Class II, BMI 35-39.9, isolated    Essential hypertension    Atypical face pain    Status post total hip replacement, left    Vitamin D deficiency    Pharyngitis       Medications:   Current Outpatient Medications on File Prior to Visit   Medication Sig Dispense Refill    benazepril (LOTENSIN) 40 MG tablet TAKE 1 TABLET BY MOUTH EVERY DAY 90 Tablet 3    amLODIPine (NORVASC) 5 MG Tab TAKE 1 TABLET BY MOUTH EVERY DAY 90 Tablet 3    atorvastatin (LIPITOR) 10 MG Tab TAKE 1 TABLET BY MOUTH EVERY DAY 90 Tablet 3    levothyroxine (SYNTHROID) 100 MCG Tab TAKE 1 TABLET BY MOUTH EVERY MORNING ON AN EMPTY STOMACH 90 Tablet 3    estradiol (ESTRACE) 0.1 MG/GM vaginal cream Insert 1 g in vagina Every Sunday and Wednesday.      Ibuprofen 200 MG Cap Take 1-2 Caps by mouth 1 time daily as needed.       No current facility-administered medications on file prior to visit.        Objective:     /80   Pulse 73   Temp 36.3 °C (97.3 °F) (Temporal)   Resp 16   Ht 1.6 m (5' 3\")   Wt 86.6 kg (191 lb)   SpO2 97%   BMI 33.83 kg/m²     Physical Exam  Vitals and nursing note reviewed.   Constitutional:       Appearance: Normal appearance.   Eyes:      General: Lids are normal. Vision grossly intact. No visual field deficit.        Right eye: No discharge.         Left eye: No discharge.      Conjunctiva/sclera:      Right eye: Hemorrhage present.      Left eye: No hemorrhage.    Cardiovascular:      Rate and Rhythm: Normal rate.      Pulses: Normal pulses.   Pulmonary:      Effort: Pulmonary effort is normal.   Neurological:      Mental Status: She is alert.         Visual acuity: with correction.   OS 20/30   OD 20/30  OU 20/30       Assessment:   1. Subconjunctival hemorrhage of right eye        2. Essential hypertension      "           Medical Decision Making:    Shikha Swift is a very pleasant 79 y.o. female who is clinically stable at today's acute urgent care visit. Presents with acute problem/ concern today.    No acute distress is noted at the time of the visit.  VSS. Appropriate for outpatient care at this time.  Benign some conjunctival hemorrhage.  Visual acuity is intact.  Eye exam is normal.  Her vital signs and blood pressure are normal today.  She has had no recent illness.  No significant contributing comorbidities except for her hypertension.  Her blood pressure is well-controlled today.  She reports taking her medicines as they have been prescribed.    Cool compresses as needed discomfort  Over-the-counter saline solution.  Discomfort or irritation  Through shared decision making a discussion of the Dx and DDx, management options (risks,benefits, and alternatives to planned treatment), natural progression and supportive care.  Expressed understanding and the treatment plan was agreed upon.   Questions were encouraged and answered     Follow Up:   Return to urgent care prn if new or worsening sx or if there is no improvement in condition prn.    Educated in Red flags and indications to immediately call 911 or present to the Emergency Department.   Vision change, double vision, blank spots in vision, eye pain, headache, dizziness or any other new or worsening symptom.      Time I spent evaluating Shikha Swift in urgent care today was 30  minutes. This time includes preparing for visit, reviewing any pertinent notes or test results, exam, obtaining HPI, interpretation of lab tests,counseling/education, medication management ( RX and/ or OTC)  and documentation as indicated above.Time does not include separately billable procedures if noted .       Please note that this dictation was created using voice recognition software. I have worked with consultants from the vendor as well as technical experts  from [facility] to optimize the interface. I have made every reasonable attempt to correct obvious errors, but I expect that there are errors of grammar and possibly content that I did not discover before finalizing the note.  This note was electronically signed by provider

## 2025-02-11 ENCOUNTER — OFFICE VISIT (OUTPATIENT)
Dept: URGENT CARE | Facility: CLINIC | Age: 80
End: 2025-02-11
Payer: MEDICARE

## 2025-02-11 VITALS
HEART RATE: 97 BPM | BODY MASS INDEX: 34.02 KG/M2 | HEIGHT: 63 IN | DIASTOLIC BLOOD PRESSURE: 70 MMHG | SYSTOLIC BLOOD PRESSURE: 188 MMHG | TEMPERATURE: 98.6 F | WEIGHT: 192 LBS | RESPIRATION RATE: 16 BRPM | OXYGEN SATURATION: 97 %

## 2025-02-11 DIAGNOSIS — J06.9 VIRAL URI WITH COUGH: ICD-10-CM

## 2025-02-11 DIAGNOSIS — I10 ELEVATED BLOOD PRESSURE READING IN OFFICE WITH DIAGNOSIS OF HYPERTENSION: ICD-10-CM

## 2025-02-11 DIAGNOSIS — E03.9 HYPOTHYROIDISM, UNSPECIFIED TYPE: ICD-10-CM

## 2025-02-11 PROCEDURE — 99214 OFFICE O/P EST MOD 30 MIN: CPT

## 2025-02-11 PROCEDURE — 3077F SYST BP >= 140 MM HG: CPT

## 2025-02-11 PROCEDURE — 3078F DIAST BP <80 MM HG: CPT

## 2025-02-11 RX ORDER — BENZONATATE 100 MG/1
100 CAPSULE ORAL 3 TIMES DAILY PRN
Qty: 60 CAPSULE | Refills: 0 | Status: SHIPPED | OUTPATIENT
Start: 2025-02-11

## 2025-02-11 RX ORDER — LEVOTHYROXINE SODIUM 100 UG/1
TABLET ORAL
Qty: 90 TABLET | Refills: 3 | Status: SHIPPED | OUTPATIENT
Start: 2025-02-11

## 2025-02-11 ASSESSMENT — FIBROSIS 4 INDEX: FIB4 SCORE: 1.692307692307692308

## 2025-02-11 ASSESSMENT — ENCOUNTER SYMPTOMS
COUGH: 1
SPUTUM PRODUCTION: 0
FEVER: 0

## 2025-02-11 NOTE — PROGRESS NOTES
Subjective:     CHIEF COMPLAINT  Chief Complaint   Patient presents with    Cough     X 2 days       HPI  Shikha Swift is a very pleasant 79 y.o. female who presents accompanied by her  with a cough and nasal congestion that has been present for the past 2 to 3 days.  She has not had any fevers.  She has not had any known sick contacts.  She has tried taking Mucinex without improvement in symptoms.    She has a history of hypertension and has been taking her blood pressure medication as prescribed.  She does not typically monitor her blood pressure at home.      REVIEW OF SYSTEMS  Review of Systems   Constitutional:  Negative for fever.   HENT:  Positive for congestion.    Respiratory:  Positive for cough. Negative for sputum production.        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    ALLERGIES  Allergies   Allergen Reactions    Hydrocodone Vomiting and Nausea     TTA=8244      Percocet [Oxycodone-Acetaminophen] Vomiting and Nausea     PBF=4767    Vicodin [Hydrocodone-Acetaminophen] Vomiting and Nausea     VWP=9048       CURRENT MEDICATIONS  Home Medications       Reviewed by Tawanna Gordon P.A.-C. (Physician Assistant) on 02/11/25 at 1229  Med List Status: <None>     Medication Last Dose Status   amLODIPine (NORVASC)  "5 MG Tab Taking Active   atorvastatin (LIPITOR) 10 MG Tab Taking Active   benazepril (LOTENSIN) 40 MG tablet Taking Active   estradiol (ESTRACE) 0.1 MG/GM vaginal cream Taking Active   Ibuprofen 200 MG Cap Taking Active   levothyroxine (SYNTHROID) 100 MCG Tab Taking Active                    SOCIAL HISTORY  Social History     Tobacco Use    Smoking status: Never    Smokeless tobacco: Never   Vaping Use    Vaping status: Never Used   Substance and Sexual Activity    Alcohol use: Yes     Alcohol/week: 0.0 oz     Comment: 1-2 per month    Drug use: No    Sexual activity: Not Currently     Partners: Male       FAMILY HISTORY  Family History   Problem Relation Age of Onset    Diabetes Mother     Diabetes Other     Hypertension Other     Stroke Other     Cancer Other           Objective:     VITAL SIGNS: BP (!) 188/70 (BP Location: Left arm, Patient Position: Sitting, BP Cuff Size: Adult)   Pulse 97   Temp 37 °C (98.6 °F) (Temporal)   Resp 16   Ht 1.6 m (5' 3\")   Wt 87.1 kg (192 lb)   SpO2 97%   BMI 34.01 kg/m²     PHYSICAL EXAM  Physical Exam  Vitals reviewed. Exam conducted with a chaperone present.   Constitutional:       General: She is not in acute distress.     Appearance: Normal appearance. She is not ill-appearing or toxic-appearing.   HENT:      Head: Normocephalic and atraumatic.      Right Ear: Tympanic membrane, ear canal and external ear normal.      Left Ear: Tympanic membrane, ear canal and external ear normal.      Nose: Congestion present.      Mouth/Throat:      Mouth: Mucous membranes are moist.      Pharynx: Oropharynx is clear. No oropharyngeal exudate or posterior oropharyngeal erythema.   Eyes:      Conjunctiva/sclera: Conjunctivae normal.      Pupils: Pupils are equal, round, and reactive to light.   Cardiovascular:      Rate and Rhythm: Normal rate and regular rhythm.      Heart sounds: Normal heart sounds.   Pulmonary:      Effort: Pulmonary effort is normal. No respiratory distress.      " Breath sounds: Normal breath sounds. No stridor. No wheezing, rhonchi or rales.   Skin:     General: Skin is warm and dry.      Coloration: Skin is not pale.   Neurological:      General: No focal deficit present.      Mental Status: She is alert and oriented to person, place, and time.   Psychiatric:         Mood and Affect: Mood normal.         Assessment/Plan:     1. Elevated blood pressure reading in office with diagnosis of hypertension    2. Viral URI with cough  - benzonatate (TESSALON) 100 MG Cap; Take 1 Capsule by mouth 3 times a day as needed for Cough.  Dispense: 60 Capsule; Refill: 0  -Follow up with PCP regarding elevated blood pressure obtained in office today  -DASH diet  -Return to clinic if symptoms worsen or fail to resolve        MDM/Comments:  Patient has a history of hypertension and has been taking blood pressure medication as prescribed. Discussed with patient keeping a blood pressure log to monitor daily blood pressure readings and following the DASH diet. Patient will follow up with their PCP. Additionally, discussed the risks of uncontrolled hypertension. Patient is not displaying systemic signs of hypertension at this time. I have prepared for this visit by personally reviewing the patient's prevous medical records, vitals, and labs including: most recent GFR of 91. Patient has stable vital signs and is non-toxic appearing.  Patient has politely declined viral testing for COVID, influenza, and RSV.  Patient has been provided benzonatate for cough relief.  Discussed supportive care with hydration, rest, Tylenol as needed. Patient demonstrated understanding of treatment plan at this time and will RTC if symptoms worsen or fail to resolve.         Differential diagnosis, natural history, supportive care, and indications for immediate follow-up discussed. All questions answered. Patient agrees with the plan of care.    Follow-up as needed if symptoms worsen or fail to improve to PCP, Urgent  care or Emergency Room.    I have personally reviewed prior external notes and test results pertinent to today's visit.  I have independently reviewed and interpreted all diagnostics ordered during this urgent care acute visit.   Discussed management options (risks,benefits, and alternatives to treatment). Pt expresses understanding and the treatment plan was agreed upon. Questions were encouraged and answered to pt's satisfaction.    Please note that this dictation was created using voice recognition software. I have made a reasonable attempt to correct obvious errors, but I expect that there are errors of grammar and possibly content that I did not discover before finalizing the note.

## 2025-02-12 NOTE — TELEPHONE ENCOUNTER
Received request via: Pharmacy    Was the patient seen in the last year in this department? Yes    Does the patient have an active prescription (recently filled or refills available) for medication(s) requested? No    Pharmacy Name: [id number] - [place], [place] - [address] AT [facility]     Does the patient have [facility] and need 100-day supply? (This applies to ALL medications) Patient does not have SCP

## 2025-03-08 ENCOUNTER — HOSPITAL ENCOUNTER (OUTPATIENT)
Facility: MEDICAL CENTER | Age: 80
End: 2025-03-08
Attending: INTERNAL MEDICINE
Payer: MEDICARE

## 2025-03-08 DIAGNOSIS — E55.9 VITAMIN D DEFICIENCY: ICD-10-CM

## 2025-03-08 DIAGNOSIS — R73.02 IGT (IMPAIRED GLUCOSE TOLERANCE): ICD-10-CM

## 2025-03-08 DIAGNOSIS — E78.5 DYSLIPIDEMIA: ICD-10-CM

## 2025-03-08 LAB
25(OH)D3 SERPL-MCNC: 41 NG/ML (ref 30–100)
ALBUMIN SERPL BCP-MCNC: 4.3 G/DL (ref 3.2–4.9)
ALBUMIN/GLOB SERPL: 1.5 G/DL
ALP SERPL-CCNC: 95 U/L (ref 30–99)
ALT SERPL-CCNC: 86 U/L (ref 2–50)
ANION GAP SERPL CALC-SCNC: 11 MMOL/L (ref 7–16)
AST SERPL-CCNC: 57 U/L (ref 12–45)
BASOPHILS # BLD AUTO: 0.5 % (ref 0–1.8)
BASOPHILS # BLD: 0.03 K/UL (ref 0–0.12)
BILIRUB SERPL-MCNC: 0.5 MG/DL (ref 0.1–1.5)
BUN SERPL-MCNC: 14 MG/DL (ref 8–22)
CALCIUM ALBUM COR SERPL-MCNC: 9.7 MG/DL (ref 8.5–10.5)
CALCIUM SERPL-MCNC: 9.9 MG/DL (ref 8.4–10.2)
CHLORIDE SERPL-SCNC: 108 MMOL/L (ref 96–112)
CHOLEST SERPL-MCNC: 152 MG/DL (ref 100–199)
CO2 SERPL-SCNC: 20 MMOL/L (ref 20–33)
CREAT SERPL-MCNC: 0.74 MG/DL (ref 0.5–1.4)
EOSINOPHIL # BLD AUTO: 0.31 K/UL (ref 0–0.51)
EOSINOPHIL NFR BLD: 5.3 % (ref 0–6.9)
ERYTHROCYTE [DISTWIDTH] IN BLOOD BY AUTOMATED COUNT: 42.4 FL (ref 35.9–50)
EST. AVERAGE GLUCOSE BLD GHB EST-MCNC: 126 MG/DL
FASTING STATUS PATIENT QL REPORTED: NORMAL
GFR SERPLBLD CREATININE-BSD FMLA CKD-EPI: 83 ML/MIN/1.73 M 2
GLOBULIN SER CALC-MCNC: 2.8 G/DL (ref 1.9–3.5)
GLUCOSE SERPL-MCNC: 118 MG/DL (ref 65–99)
HBA1C MFR BLD: 6 % (ref 4–5.6)
HCT VFR BLD AUTO: 42.3 % (ref 37–47)
HDLC SERPL-MCNC: 48 MG/DL
HGB BLD-MCNC: 13.7 G/DL (ref 12–16)
IMM GRANULOCYTES # BLD AUTO: 0.01 K/UL (ref 0–0.11)
IMM GRANULOCYTES NFR BLD AUTO: 0.2 % (ref 0–0.9)
LDLC SERPL CALC-MCNC: 84 MG/DL
LYMPHOCYTES # BLD AUTO: 1.3 K/UL (ref 1–4.8)
LYMPHOCYTES NFR BLD: 22.1 % (ref 22–41)
MCH RBC QN AUTO: 29 PG (ref 27–33)
MCHC RBC AUTO-ENTMCNC: 32.4 G/DL (ref 32.2–35.5)
MCV RBC AUTO: 89.4 FL (ref 81.4–97.8)
MONOCYTES # BLD AUTO: 0.54 K/UL (ref 0–0.85)
MONOCYTES NFR BLD AUTO: 9.2 % (ref 0–13.4)
NEUTROPHILS # BLD AUTO: 3.69 K/UL (ref 1.82–7.42)
NEUTROPHILS NFR BLD: 62.7 % (ref 44–72)
NRBC # BLD AUTO: 0 K/UL
NRBC BLD-RTO: 0 /100 WBC (ref 0–0.2)
PLATELET # BLD AUTO: 214 K/UL (ref 164–446)
PMV BLD AUTO: 10 FL (ref 9–12.9)
POTASSIUM SERPL-SCNC: 4.2 MMOL/L (ref 3.6–5.5)
PROT SERPL-MCNC: 7.1 G/DL (ref 6–8.2)
RBC # BLD AUTO: 4.73 M/UL (ref 4.2–5.4)
SODIUM SERPL-SCNC: 139 MMOL/L (ref 135–145)
TRIGL SERPL-MCNC: 100 MG/DL (ref 0–149)
WBC # BLD AUTO: 5.9 K/UL (ref 4.8–10.8)

## 2025-03-08 PROCEDURE — 80053 COMPREHEN METABOLIC PANEL: CPT

## 2025-03-08 PROCEDURE — 80061 LIPID PANEL: CPT

## 2025-03-08 PROCEDURE — 85025 COMPLETE CBC W/AUTO DIFF WBC: CPT

## 2025-03-08 PROCEDURE — 82306 VITAMIN D 25 HYDROXY: CPT

## 2025-03-08 PROCEDURE — 83036 HEMOGLOBIN GLYCOSYLATED A1C: CPT

## 2025-03-08 PROCEDURE — 36415 COLL VENOUS BLD VENIPUNCTURE: CPT

## 2025-03-10 ENCOUNTER — RESULTS FOLLOW-UP (OUTPATIENT)
Dept: MEDICAL GROUP | Facility: LAB | Age: 80
End: 2025-03-10
Payer: MEDICARE

## 2025-06-04 ENCOUNTER — TELEPHONE (OUTPATIENT)
Dept: MEDICAL GROUP | Facility: LAB | Age: 80
End: 2025-06-04

## 2025-06-04 ENCOUNTER — OFFICE VISIT (OUTPATIENT)
Dept: MEDICAL GROUP | Facility: LAB | Age: 80
End: 2025-06-04
Payer: MEDICARE

## 2025-06-04 VITALS
HEART RATE: 84 BPM | SYSTOLIC BLOOD PRESSURE: 132 MMHG | BODY MASS INDEX: 34.22 KG/M2 | WEIGHT: 193.12 LBS | TEMPERATURE: 97.1 F | OXYGEN SATURATION: 96 % | RESPIRATION RATE: 16 BRPM | DIASTOLIC BLOOD PRESSURE: 62 MMHG | HEIGHT: 63 IN

## 2025-06-04 DIAGNOSIS — R73.02 IGT (IMPAIRED GLUCOSE TOLERANCE): Primary | ICD-10-CM

## 2025-06-04 DIAGNOSIS — Z78.0 MENOPAUSE: ICD-10-CM

## 2025-06-04 DIAGNOSIS — E66.811 OBESITY (BMI 30.0-34.9): ICD-10-CM

## 2025-06-04 PROCEDURE — 99214 OFFICE O/P EST MOD 30 MIN: CPT | Performed by: INTERNAL MEDICINE

## 2025-06-04 PROCEDURE — 3078F DIAST BP <80 MM HG: CPT | Performed by: INTERNAL MEDICINE

## 2025-06-04 PROCEDURE — 3075F SYST BP GE 130 - 139MM HG: CPT | Performed by: INTERNAL MEDICINE

## 2025-06-04 ASSESSMENT — FIBROSIS 4 INDEX: FIB4 SCORE: 2.24

## 2025-06-04 ASSESSMENT — PATIENT HEALTH QUESTIONNAIRE - PHQ9: CLINICAL INTERPRETATION OF PHQ2 SCORE: 0

## 2025-06-04 NOTE — LETTER
[employer]  Presley Black D.O.  [address]  [place] [address]  Fax: [phone]   Authorization for Release/Disclosure of   Protected Health Information   Name: LIT BURNETT : 1945 SSN: [id number]   Address: [address] [place] [address]  [place] [address] Phone:    [phone] (home)    I authorize the entity listed below to release/disclose the PHI below to:   [employer]/Presley Black D.O. and Presley Black D.O.   Provider or Entity Name:  Fanny Perea MD   Address   City, State, Zip  [place] Phone:      Fax:     Reason for request: continuity of care   Information to be released:    [  ] LAST COLONOSCOPY,  including any PATH REPORT and follow-up  [  ] LAST FIT/COLOGUARD RESULT [  ] LAST DEXA  [  ] LAST MAMMOGRAM  [  ] LAST PAP  [  ] LAST LABS [  ] RETINA EXAM REPORT  [  ] IMMUNIZATION RECORDS  [ x ] Release all info      [  ] Check here and initial the line next to each item to release ALL health information INCLUDING  _____ Care and treatment for drug and / or alcohol abuse  _____ HIV testing, infection status, or AIDS  _____ Genetic Testing    DATES OF SERVICE OR TIME PERIOD TO BE DISCLOSED: _____________  I understand and acknowledge that:  * This Authorization may be revoked at any time by you in writing, except if your health information has already been used or disclosed.  * Your health information that will be used or disclosed as a result of you signing this authorization could be re-disclosed by the recipient. If this occurs, your re-disclosed health information may no longer be protected by State or Federal laws.  * You may refuse to sign this Authorization. Your refusal will not affect your ability to obtain treatment.  * This Authorization becomes effective upon signing and will  on (date) __________.      If no date is indicated, this Authorization will  one (1) year from the signature date.    Name: Lit Boothe  Jamison  Signature: Date:   6/4/2025     PLEASE FAX REQUESTED RECORDS BACK TO: [phone]

## 2025-06-04 NOTE — TELEPHONE ENCOUNTER
Faxed juanpablo to [facility] [employer]  Fax: [phone]    Successful send report received

## 2025-06-04 NOTE — PROGRESS NOTES
CC: Shikha Swift is a 80 y.o. female is suffering from   Chief Complaint   Patient presents with    Follow-Up    Medication Problem         SUBJECTIVE:  1. IGT (impaired glucose tolerance)  Lauryn is here for follow-up is suffering from impaired glucose tolerance, we have discussed the importance of working on her diet and exercise    2. Menopause  History of menopause I would like the patient undergo DEXA scan old records also ordered from her OB/GYN    3. 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  Heart: No chest pain.  Lungs:  No shortness of Breath.  Psychological: Mack unusual Anxiety depression     PHYSICAL EXAM   Physical Exam             Constitutional: Alert, cooperative, not in acute distress.  Cardiovascular:  Rate Rhythm is regular without murmurs rubs clicks.     Thorax & Lungs: Clear to auscultation, no wheezing, rhonchi, or rales  HENT: Normocephalic, Atraumatic.  Eyes: PERRLA, EOMI, Conjunctiva normal.   Neck: Trachia is midline no swelling of the thyroid.   Lymphatic: No lymphadenopathy noted.   Neurologic: Alert & oriented x 3, cranial nerves II through XII are intact, Normal motor function, Normal sensory function, No focal deficits noted.   Psychiatric: Affect normal, Judgment normal, Mood normal.     VITAL SIGNS:/62 (BP Location: Left arm,  "Patient Position: Sitting, BP Cuff Size: Adult)   Pulse 84   Temp 36.2 °C (97.1 °F) (Temporal)   Resp 16   Ht 1.6 m (5' 3\")   Wt 87.6 kg (193 lb 2 oz)   SpO2 96%   BMI 34.21 kg/m²     Labs: Reviewed    Assessment:                                                     Plan:  [unfilled]             1. IGT (impaired glucose tolerance) (Primary)  Hemoglobin A1c ordered check basic metabolic panel in 4 months  - HEMOGLOBIN A1C; Future  - Basic Metabolic Panel; Future    2. Menopause  DEXA scan ordered  - DS-BONE DENSITY STUDY (DEXA); Future    3. Obesity (BMI 30.0-34.9)  Obesity, patient is to work on diet exercise             [1]   Social History  Tobacco Use    Smoking status: Never    Smokeless tobacco: Never   Vaping Use    Vaping status: Never Used   Substance Use Topics    Alcohol use: Yes     Alcohol/week: 0.0 oz     Comment: 1-2 per month    Drug use: No   [2]   Current Outpatient Medications:     levothyroxine (SYNTHROID) 100 MCG Tab, TAKE 1 TABLET BY MOUTH EVERY MORNING ON AN EMPTY STOMACH, Disp: 90 Tablet, Rfl: 3    benazepril (LOTENSIN) 40 MG tablet, TAKE 1 TABLET BY MOUTH EVERY DAY, Disp: 90 Tablet, Rfl: 3    amLODIPine (NORVASC) 5 MG Tab, TAKE 1 TABLET BY MOUTH EVERY DAY, Disp: 90 Tablet, Rfl: 3    atorvastatin (LIPITOR) 10 MG Tab, TAKE 1 TABLET BY MOUTH EVERY DAY, Disp: 90 Tablet, Rfl: 3    estradiol (ESTRACE) 0.1 MG/GM vaginal cream, Insert 1 g in vagina Every Sunday and Wednesday., Disp: , Rfl:     Ibuprofen 200 MG Cap, Take 1-2 Caps by mouth 1 time daily as needed., Disp: , Rfl:     benzonatate (TESSALON) 100 MG Cap, Take 1 Capsule by mouth 3 times a day as needed for Cough. (Patient not taking: Reported on 6/4/2025), Disp: 60 Capsule, Rfl: 0    "

## 2025-06-10 RX ORDER — ATORVASTATIN CALCIUM 10 MG/1
10 TABLET, FILM COATED ORAL
Qty: 90 TABLET | Refills: 3 | Status: SHIPPED | OUTPATIENT
Start: 2025-06-10

## 2025-06-23 DIAGNOSIS — I10 ESSENTIAL HYPERTENSION: ICD-10-CM

## 2025-06-23 RX ORDER — BENAZEPRIL HYDROCHLORIDE 40 MG/1
40 TABLET ORAL
Qty: 90 TABLET | Refills: 3 | Status: SHIPPED | OUTPATIENT
Start: 2025-06-23

## 2025-06-23 RX ORDER — AMLODIPINE BESYLATE 5 MG/1
5 TABLET ORAL
Qty: 90 TABLET | Refills: 3 | Status: SHIPPED | OUTPATIENT
Start: 2025-06-23